# Patient Record
Sex: FEMALE | Race: ASIAN | NOT HISPANIC OR LATINO
[De-identification: names, ages, dates, MRNs, and addresses within clinical notes are randomized per-mention and may not be internally consistent; named-entity substitution may affect disease eponyms.]

---

## 2017-05-12 PROBLEM — Z00.00 ENCOUNTER FOR PREVENTIVE HEALTH EXAMINATION: Status: ACTIVE | Noted: 2017-05-12

## 2017-05-15 ENCOUNTER — APPOINTMENT (OUTPATIENT)
Dept: ANTEPARTUM | Facility: CLINIC | Age: 37
End: 2017-05-15

## 2017-07-11 ENCOUNTER — OUTPATIENT (OUTPATIENT)
Dept: OUTPATIENT SERVICES | Age: 37
LOS: 1 days | Discharge: HOME | End: 2017-07-11

## 2017-07-12 ENCOUNTER — APPOINTMENT (OUTPATIENT)
Dept: PEDIATRIC CARDIOLOGY | Facility: CLINIC | Age: 37
End: 2017-07-12

## 2017-09-19 ENCOUNTER — INPATIENT (INPATIENT)
Facility: HOSPITAL | Age: 37
LOS: 0 days | Discharge: HOME | End: 2017-09-20
Attending: OBSTETRICS & GYNECOLOGY | Admitting: OBSTETRICS & GYNECOLOGY

## 2017-09-19 VITALS — HEIGHT: 59 IN | WEIGHT: 149.91 LBS

## 2017-09-19 DIAGNOSIS — O26.899 OTHER SPECIFIED PREGNANCY RELATED CONDITIONS, UNSPECIFIED TRIMESTER: ICD-10-CM

## 2017-09-19 DIAGNOSIS — Z3A.00 WEEKS OF GESTATION OF PREGNANCY NOT SPECIFIED: ICD-10-CM

## 2017-09-19 LAB
APPEARANCE UR: CLEAR — SIGNIFICANT CHANGE UP
BACTERIA # UR AUTO: SIGNIFICANT CHANGE UP
BILIRUB UR-MCNC: NEGATIVE — SIGNIFICANT CHANGE UP
BLD GP AB SCN SERPL QL: NEGATIVE — SIGNIFICANT CHANGE UP
BLOOD UR QL VISUAL: NEGATIVE — SIGNIFICANT CHANGE UP
COLOR SPEC: SIGNIFICANT CHANGE UP
GLUCOSE UR-MCNC: NEGATIVE — SIGNIFICANT CHANGE UP
HCT VFR BLD CALC: 34.8 % — SIGNIFICANT CHANGE UP (ref 34.5–45)
HGB BLD-MCNC: 11.3 G/DL — LOW (ref 11.5–15.5)
KETONES UR-MCNC: SIGNIFICANT CHANGE UP
LEUKOCYTE ESTERASE UR-ACNC: NEGATIVE — SIGNIFICANT CHANGE UP
MCHC RBC-ENTMCNC: 27 PG — SIGNIFICANT CHANGE UP (ref 27–34)
MCHC RBC-ENTMCNC: 32.5 % — SIGNIFICANT CHANGE UP (ref 32–36)
MCV RBC AUTO: 83.3 FL — SIGNIFICANT CHANGE UP (ref 80–100)
MUCOUS THREADS # UR AUTO: SIGNIFICANT CHANGE UP
NITRITE UR-MCNC: NEGATIVE — SIGNIFICANT CHANGE UP
NRBC # FLD: 0 — SIGNIFICANT CHANGE UP
PH UR: 6 — SIGNIFICANT CHANGE UP (ref 4.6–8)
PLATELET # BLD AUTO: 237 K/UL — SIGNIFICANT CHANGE UP (ref 150–400)
PMV BLD: 10.9 FL — SIGNIFICANT CHANGE UP (ref 7–13)
PROT UR-MCNC: NEGATIVE — SIGNIFICANT CHANGE UP
RBC # BLD: 4.18 M/UL — SIGNIFICANT CHANGE UP (ref 3.8–5.2)
RBC # FLD: 16.3 % — HIGH (ref 10.3–14.5)
RBC CASTS # UR COMP ASSIST: SIGNIFICANT CHANGE UP (ref 0–?)
RH IG SCN BLD-IMP: POSITIVE — SIGNIFICANT CHANGE UP
SP GR SPEC: 1.01 — SIGNIFICANT CHANGE UP (ref 1–1.03)
SQUAMOUS # UR AUTO: SIGNIFICANT CHANGE UP
UROBILINOGEN FLD QL: NORMAL E.U. — SIGNIFICANT CHANGE UP (ref 0.1–0.2)
WBC # BLD: 9.8 K/UL — SIGNIFICANT CHANGE UP (ref 3.8–10.5)
WBC # FLD AUTO: 9.8 K/UL — SIGNIFICANT CHANGE UP (ref 3.8–10.5)
WBC UR QL: SIGNIFICANT CHANGE UP (ref 0–?)

## 2017-09-19 RX ORDER — SODIUM CHLORIDE 9 MG/ML
1000 INJECTION, SOLUTION INTRAVENOUS
Qty: 0 | Refills: 0 | Status: DISCONTINUED | OUTPATIENT
Start: 2017-09-19 | End: 2017-09-20

## 2017-09-19 RX ORDER — AMPICILLIN TRIHYDRATE 250 MG
2 CAPSULE ORAL ONCE
Qty: 0 | Refills: 0 | Status: COMPLETED | OUTPATIENT
Start: 2017-09-19 | End: 2017-09-19

## 2017-09-19 RX ORDER — SODIUM CHLORIDE 9 MG/ML
1000 INJECTION, SOLUTION INTRAVENOUS ONCE
Qty: 0 | Refills: 0 | Status: DISCONTINUED | OUTPATIENT
Start: 2017-09-19 | End: 2017-09-19

## 2017-09-19 RX ORDER — INFLUENZA VIRUS VACCINE 15; 15; 15; 15 UG/.5ML; UG/.5ML; UG/.5ML; UG/.5ML
0.5 SUSPENSION INTRAMUSCULAR ONCE
Qty: 0 | Refills: 0 | Status: DISCONTINUED | OUTPATIENT
Start: 2017-09-19 | End: 2017-09-20

## 2017-09-19 RX ORDER — OXYTOCIN 10 UNIT/ML
333.33 VIAL (ML) INJECTION
Qty: 20 | Refills: 0 | Status: DISCONTINUED | OUTPATIENT
Start: 2017-09-19 | End: 2017-09-19

## 2017-09-19 RX ORDER — AMPICILLIN TRIHYDRATE 250 MG
1 CAPSULE ORAL EVERY 4 HOURS
Qty: 0 | Refills: 0 | Status: DISCONTINUED | OUTPATIENT
Start: 2017-09-19 | End: 2017-09-20

## 2017-09-19 RX ORDER — MAGNESIUM SULFATE 500 MG/ML
2 VIAL (ML) INJECTION
Qty: 40 | Refills: 0 | Status: DISCONTINUED | OUTPATIENT
Start: 2017-09-19 | End: 2017-09-20

## 2017-09-19 RX ORDER — SODIUM CHLORIDE 9 MG/ML
1000 INJECTION, SOLUTION INTRAVENOUS
Qty: 0 | Refills: 0 | Status: DISCONTINUED | OUTPATIENT
Start: 2017-09-19 | End: 2017-09-19

## 2017-09-19 RX ORDER — MAGNESIUM SULFATE 500 MG/ML
4 VIAL (ML) INJECTION ONCE
Qty: 0 | Refills: 0 | Status: COMPLETED | OUTPATIENT
Start: 2017-09-19 | End: 2017-09-19

## 2017-09-19 RX ORDER — AMPICILLIN TRIHYDRATE 250 MG
CAPSULE ORAL
Qty: 0 | Refills: 0 | Status: DISCONTINUED | OUTPATIENT
Start: 2017-09-19 | End: 2017-09-20

## 2017-09-19 RX ADMIN — Medication 12 MILLIGRAM(S): at 13:12

## 2017-09-19 RX ADMIN — Medication 50 GM/HR: at 13:58

## 2017-09-19 RX ADMIN — Medication 216 GRAM(S): at 13:59

## 2017-09-19 RX ADMIN — Medication 300 GRAM(S): at 13:15

## 2017-09-19 RX ADMIN — Medication 50 GM/HR: at 23:01

## 2017-09-19 RX ADMIN — Medication 108 GRAM(S): at 22:00

## 2017-09-19 RX ADMIN — Medication 108 GRAM(S): at 18:17

## 2017-09-19 RX ADMIN — SODIUM CHLORIDE 50 MILLILITER(S): 9 INJECTION, SOLUTION INTRAVENOUS at 13:59

## 2017-09-20 ENCOUNTER — TRANSCRIPTION ENCOUNTER (OUTPATIENT)
Age: 37
End: 2017-09-20

## 2017-09-20 ENCOUNTER — APPOINTMENT (OUTPATIENT)
Dept: ANTEPARTUM | Facility: HOSPITAL | Age: 37
End: 2017-09-20
Payer: COMMERCIAL

## 2017-09-20 ENCOUNTER — ASOB RESULT (OUTPATIENT)
Age: 37
End: 2017-09-20

## 2017-09-20 LAB
SPECIMEN SOURCE: SIGNIFICANT CHANGE UP
T PALLIDUM AB TITR SER: NEGATIVE — SIGNIFICANT CHANGE UP

## 2017-09-20 PROCEDURE — 76805 OB US >/= 14 WKS SNGL FETUS: CPT | Mod: 26

## 2017-09-20 PROCEDURE — 76819 FETAL BIOPHYS PROFIL W/O NST: CPT | Mod: 26

## 2017-09-20 RX ORDER — DEXTROSE 50 % IN WATER 50 %
25 SYRINGE (ML) INTRAVENOUS ONCE
Qty: 0 | Refills: 0 | Status: DISCONTINUED | OUTPATIENT
Start: 2017-09-20 | End: 2017-09-20

## 2017-09-20 RX ORDER — INSULIN LISPRO 100/ML
10 VIAL (ML) SUBCUTANEOUS
Qty: 0 | Refills: 0 | Status: DISCONTINUED | OUTPATIENT
Start: 2017-09-20 | End: 2017-09-20

## 2017-09-20 RX ORDER — SODIUM CHLORIDE 9 MG/ML
1000 INJECTION, SOLUTION INTRAVENOUS
Qty: 0 | Refills: 0 | Status: DISCONTINUED | OUTPATIENT
Start: 2017-09-20 | End: 2017-09-20

## 2017-09-20 RX ORDER — MAGNESIUM SULFATE 500 MG/ML
2 VIAL (ML) INJECTION
Qty: 40 | Refills: 0 | Status: DISCONTINUED | OUTPATIENT
Start: 2017-09-20 | End: 2017-09-20

## 2017-09-20 RX ORDER — INSULIN DETEMIR 100/ML (3)
34 INSULIN PEN (ML) SUBCUTANEOUS
Qty: 0 | Refills: 0 | COMMUNITY

## 2017-09-20 RX ORDER — DEXTROSE 50 % IN WATER 50 %
1 SYRINGE (ML) INTRAVENOUS ONCE
Qty: 0 | Refills: 0 | Status: DISCONTINUED | OUTPATIENT
Start: 2017-09-20 | End: 2017-09-20

## 2017-09-20 RX ORDER — DEXTROSE 50 % IN WATER 50 %
12.5 SYRINGE (ML) INTRAVENOUS ONCE
Qty: 0 | Refills: 0 | Status: DISCONTINUED | OUTPATIENT
Start: 2017-09-20 | End: 2017-09-20

## 2017-09-20 RX ORDER — INSULIN GLARGINE 100 [IU]/ML
34 INJECTION, SOLUTION SUBCUTANEOUS AT BEDTIME
Qty: 0 | Refills: 0 | Status: DISCONTINUED | OUTPATIENT
Start: 2017-09-20 | End: 2017-09-20

## 2017-09-20 RX ORDER — INSULIN LISPRO 100/ML
0 VIAL (ML) SUBCUTANEOUS
Qty: 0 | Refills: 0 | COMMUNITY

## 2017-09-20 RX ORDER — GLUCAGON INJECTION, SOLUTION 0.5 MG/.1ML
1 INJECTION, SOLUTION SUBCUTANEOUS ONCE
Qty: 0 | Refills: 0 | Status: DISCONTINUED | OUTPATIENT
Start: 2017-09-20 | End: 2017-09-20

## 2017-09-20 RX ADMIN — Medication 12 MILLIGRAM(S): at 14:04

## 2017-09-20 RX ADMIN — Medication 1 TABLET(S): at 15:40

## 2017-09-20 RX ADMIN — Medication 50 GM/HR: at 08:46

## 2017-09-20 RX ADMIN — SODIUM CHLORIDE 75 MILLILITER(S): 9 INJECTION, SOLUTION INTRAVENOUS at 02:04

## 2017-09-20 RX ADMIN — SODIUM CHLORIDE 75 MILLILITER(S): 9 INJECTION, SOLUTION INTRAVENOUS at 00:18

## 2017-09-20 RX ADMIN — Medication 108 GRAM(S): at 10:50

## 2017-09-20 RX ADMIN — Medication 108 GRAM(S): at 02:04

## 2017-09-20 RX ADMIN — Medication 50 GM/HR: at 07:09

## 2017-09-20 RX ADMIN — Medication 10 UNIT(S): at 12:22

## 2017-09-20 RX ADMIN — Medication 108 GRAM(S): at 06:02

## 2017-09-20 RX ADMIN — SODIUM CHLORIDE 75 MILLILITER(S): 9 INJECTION, SOLUTION INTRAVENOUS at 06:02

## 2017-09-20 NOTE — DISCHARGE NOTE ANTEPARTUM - HOSPITAL COURSE
Patient admitted at 32w1d with contractions. Patient received betamethasone for fetal lung maturity, magnesium sulfate for tocolysis and antibiotics for GBS prophylaxis with good effect. On HD#2 patient no longer feeling contractions. Patient d/c HD#2 with close interval follow up. PTL precautions given. Patient admitted at 32w1d with contractions and CL 0.6cm. Patient received betamethasone for fetal lung maturity, magnesium sulfate for tocolysis and antibiotics for GBS prophylaxis with good effect. On HD#2 patient no longer feeling contractions. Patient d/c HD#2 with close interval follow up. PTL precautions given.

## 2017-09-20 NOTE — DISCHARGE NOTE ANTEPARTUM - CARE PLAN
Principal Discharge DX:	 contractions  Goal:	Home  Instructions for follow-up, activity and diet:	Normal diet and activity as tolerated

## 2017-09-20 NOTE — DISCHARGE NOTE ANTEPARTUM - CARE PROVIDER_API CALL
Callie Monahan), Obstetrics and Gynecology  [address]  [address]  [place] [address]  Phone: [phone]  Fax: [phone]

## 2017-09-20 NOTE — DISCHARGE NOTE ANTEPARTUM - PATIENT PORTAL LINK FT
“You can access the Follow[employer] Patient Portal, offered by [facility], by registering with the following website: [email or web address]”

## 2017-09-20 NOTE — DISCHARGE NOTE ANTEPARTUM - ADDITIONAL INSTRUCTIONS
Please follow up with Dr. Monahan as scheduled  Return to L&D with contractions, leakage of fluid, vaginal bleeding and/or decreased fetal movement

## 2017-09-21 LAB
BACTERIA UR CULT: SIGNIFICANT CHANGE UP
SPECIMEN SOURCE: SIGNIFICANT CHANGE UP

## 2017-09-22 LAB — GP B STREP GENITAL QL CULT: SIGNIFICANT CHANGE UP

## 2017-11-03 ENCOUNTER — TRANSCRIPTION ENCOUNTER (OUTPATIENT)
Age: 37
End: 2017-11-03

## 2017-11-03 ENCOUNTER — OUTPATIENT (OUTPATIENT)
Dept: OUTPATIENT SERVICES | Facility: HOSPITAL | Age: 37
LOS: 1 days | End: 2017-11-03

## 2017-11-03 ENCOUNTER — EMERGENCY (EMERGENCY)
Facility: HOSPITAL | Age: 37
LOS: 1 days | Discharge: STILL ADMITTED OR ADMINISTRATIVE | End: 2017-11-03
Admitting: EMERGENCY MEDICINE

## 2017-11-03 VITALS
RESPIRATION RATE: 18 BRPM | OXYGEN SATURATION: 100 % | DIASTOLIC BLOOD PRESSURE: 87 MMHG | SYSTOLIC BLOOD PRESSURE: 144 MMHG | HEART RATE: 79 BPM

## 2017-11-03 DIAGNOSIS — O34.219 MATERNAL CARE FOR UNSPECIFIED TYPE SCAR FROM PREVIOUS CESAREAN DELIVERY: ICD-10-CM

## 2017-11-03 LAB
APPEARANCE UR: CLEAR — SIGNIFICANT CHANGE UP
BACTERIA # UR AUTO: SIGNIFICANT CHANGE UP
BILIRUB UR-MCNC: NEGATIVE — SIGNIFICANT CHANGE UP
BLD GP AB SCN SERPL QL: NEGATIVE — SIGNIFICANT CHANGE UP
BLOOD UR QL VISUAL: NEGATIVE — SIGNIFICANT CHANGE UP
BUN SERPL-MCNC: 6 MG/DL — LOW (ref 7–23)
CALCIUM SERPL-MCNC: 9 MG/DL — SIGNIFICANT CHANGE UP (ref 8.4–10.5)
CHLORIDE SERPL-SCNC: 100 MMOL/L — SIGNIFICANT CHANGE UP (ref 98–107)
CO2 SERPL-SCNC: 21 MMOL/L — LOW (ref 22–31)
COLOR SPEC: SIGNIFICANT CHANGE UP
CREAT SERPL-MCNC: 0.66 MG/DL — SIGNIFICANT CHANGE UP (ref 0.5–1.3)
GLUCOSE SERPL-MCNC: 74 MG/DL — SIGNIFICANT CHANGE UP (ref 70–99)
GLUCOSE UR-MCNC: NEGATIVE — SIGNIFICANT CHANGE UP
HCT VFR BLD CALC: 37.2 % — SIGNIFICANT CHANGE UP (ref 34.5–45)
HGB BLD-MCNC: 12.3 G/DL — SIGNIFICANT CHANGE UP (ref 11.5–15.5)
KETONES UR-MCNC: NEGATIVE — SIGNIFICANT CHANGE UP
LEUKOCYTE ESTERASE UR-ACNC: HIGH
MCHC RBC-ENTMCNC: 27.5 PG — SIGNIFICANT CHANGE UP (ref 27–34)
MCHC RBC-ENTMCNC: 33.1 % — SIGNIFICANT CHANGE UP (ref 32–36)
MCV RBC AUTO: 83.2 FL — SIGNIFICANT CHANGE UP (ref 80–100)
NITRITE UR-MCNC: NEGATIVE — SIGNIFICANT CHANGE UP
NON-SQ EPI CELLS # UR AUTO: <1 — SIGNIFICANT CHANGE UP
NRBC # FLD: 0 — SIGNIFICANT CHANGE UP
PH UR: 6.5 — SIGNIFICANT CHANGE UP (ref 4.6–8)
PLATELET # BLD AUTO: 231 K/UL — SIGNIFICANT CHANGE UP (ref 150–400)
PMV BLD: 11.6 FL — SIGNIFICANT CHANGE UP (ref 7–13)
POTASSIUM SERPL-MCNC: 3.7 MMOL/L — SIGNIFICANT CHANGE UP (ref 3.5–5.3)
POTASSIUM SERPL-SCNC: 3.7 MMOL/L — SIGNIFICANT CHANGE UP (ref 3.5–5.3)
PROT UR-MCNC: NEGATIVE — SIGNIFICANT CHANGE UP
RBC # BLD: 4.47 M/UL — SIGNIFICANT CHANGE UP (ref 3.8–5.2)
RBC # FLD: 16.5 % — HIGH (ref 10.3–14.5)
RBC CASTS # UR COMP ASSIST: SIGNIFICANT CHANGE UP (ref 0–?)
RH IG SCN BLD-IMP: POSITIVE — SIGNIFICANT CHANGE UP
SODIUM SERPL-SCNC: 138 MMOL/L — SIGNIFICANT CHANGE UP (ref 135–145)
SP GR SPEC: 1.01 — SIGNIFICANT CHANGE UP (ref 1–1.03)
SQUAMOUS # UR AUTO: SIGNIFICANT CHANGE UP
UROBILINOGEN FLD QL: NORMAL E.U. — SIGNIFICANT CHANGE UP (ref 0.1–0.2)
WBC # BLD: 9.43 K/UL — SIGNIFICANT CHANGE UP (ref 3.8–10.5)
WBC # FLD AUTO: 9.43 K/UL — SIGNIFICANT CHANGE UP (ref 3.8–10.5)
WBC UR QL: SIGNIFICANT CHANGE UP (ref 0–?)

## 2017-11-03 NOTE — ED ADULT TRIAGE NOTE - CHIEF COMPLAINT QUOTE
pt presents 35 weeks pregnant c/o active contractions 1 minute apart, began at 9pm. ems states this is pt's 4th pregnancy. PMHX: gestational dm, miscarriage x 1. pt direct to L&D

## 2017-11-04 ENCOUNTER — TRANSCRIPTION ENCOUNTER (OUTPATIENT)
Age: 37
End: 2017-11-04

## 2017-11-04 ENCOUNTER — INPATIENT (INPATIENT)
Facility: HOSPITAL | Age: 37
LOS: 2 days | Discharge: HOME | End: 2017-11-07
Attending: OBSTETRICS & GYNECOLOGY | Admitting: OBSTETRICS & GYNECOLOGY

## 2017-11-04 VITALS
DIASTOLIC BLOOD PRESSURE: 41 MMHG | OXYGEN SATURATION: 97 % | TEMPERATURE: 98 F | HEART RATE: 86 BPM | RESPIRATION RATE: 16 BRPM | SYSTOLIC BLOOD PRESSURE: 96 MMHG

## 2017-11-04 DIAGNOSIS — Z3A.00 WEEKS OF GESTATION OF PREGNANCY NOT SPECIFIED: ICD-10-CM

## 2017-11-04 DIAGNOSIS — O26.899 OTHER SPECIFIED PREGNANCY RELATED CONDITIONS, UNSPECIFIED TRIMESTER: ICD-10-CM

## 2017-11-04 LAB
BASOPHILS # BLD AUTO: 0.04 K/UL — SIGNIFICANT CHANGE UP (ref 0–0.2)
BASOPHILS NFR BLD AUTO: 0.4 % — SIGNIFICANT CHANGE UP (ref 0–2)
BLD GP AB SCN SERPL QL: NEGATIVE — SIGNIFICANT CHANGE UP
EOSINOPHIL # BLD AUTO: 0.14 K/UL — SIGNIFICANT CHANGE UP (ref 0–0.5)
EOSINOPHIL NFR BLD AUTO: 1.3 % — SIGNIFICANT CHANGE UP (ref 0–6)
GLUCOSE BLDC GLUCOMTR-MCNC: 101 MG/DL — HIGH (ref 70–99)
GLUCOSE BLDC GLUCOMTR-MCNC: 103 MG/DL — HIGH (ref 70–99)
GLUCOSE BLDC GLUCOMTR-MCNC: 131 MG/DL — HIGH (ref 70–99)
GLUCOSE BLDC GLUCOMTR-MCNC: 75 MG/DL — SIGNIFICANT CHANGE UP (ref 70–99)
GLUCOSE BLDC GLUCOMTR-MCNC: 80 MG/DL — SIGNIFICANT CHANGE UP (ref 70–99)
HCT VFR BLD CALC: 29.1 % — LOW (ref 34.5–45)
HCT VFR BLD CALC: 39.4 % — SIGNIFICANT CHANGE UP (ref 34.5–45)
HGB BLD-MCNC: 12.5 G/DL — SIGNIFICANT CHANGE UP (ref 11.5–15.5)
HGB BLD-MCNC: 9.6 G/DL — LOW (ref 11.5–15.5)
IMM GRANULOCYTES # BLD AUTO: 0.06 # — SIGNIFICANT CHANGE UP
IMM GRANULOCYTES NFR BLD AUTO: 0.5 % — SIGNIFICANT CHANGE UP (ref 0–1.5)
LYMPHOCYTES # BLD AUTO: 2.49 K/UL — SIGNIFICANT CHANGE UP (ref 1–3.3)
LYMPHOCYTES # BLD AUTO: 22.5 % — SIGNIFICANT CHANGE UP (ref 13–44)
MCHC RBC-ENTMCNC: 26.7 PG — LOW (ref 27–34)
MCHC RBC-ENTMCNC: 27.3 PG — SIGNIFICANT CHANGE UP (ref 27–34)
MCHC RBC-ENTMCNC: 31.7 % — LOW (ref 32–36)
MCHC RBC-ENTMCNC: 33 % — SIGNIFICANT CHANGE UP (ref 32–36)
MCV RBC AUTO: 82.7 FL — SIGNIFICANT CHANGE UP (ref 80–100)
MCV RBC AUTO: 84 FL — SIGNIFICANT CHANGE UP (ref 80–100)
MONOCYTES # BLD AUTO: 0.96 K/UL — HIGH (ref 0–0.9)
MONOCYTES NFR BLD AUTO: 8.7 % — SIGNIFICANT CHANGE UP (ref 2–14)
NEUTROPHILS # BLD AUTO: 7.36 K/UL — SIGNIFICANT CHANGE UP (ref 1.8–7.4)
NEUTROPHILS NFR BLD AUTO: 66.6 % — SIGNIFICANT CHANGE UP (ref 43–77)
NRBC # FLD: 0 — SIGNIFICANT CHANGE UP
NRBC # FLD: 0 — SIGNIFICANT CHANGE UP
PLATELET # BLD AUTO: 218 K/UL — SIGNIFICANT CHANGE UP (ref 150–400)
PLATELET # BLD AUTO: 253 K/UL — SIGNIFICANT CHANGE UP (ref 150–400)
PMV BLD: 11 FL — SIGNIFICANT CHANGE UP (ref 7–13)
PMV BLD: 11.1 FL — SIGNIFICANT CHANGE UP (ref 7–13)
RBC # BLD: 3.52 M/UL — LOW (ref 3.8–5.2)
RBC # BLD: 4.69 M/UL — SIGNIFICANT CHANGE UP (ref 3.8–5.2)
RBC # FLD: 16.4 % — HIGH (ref 10.3–14.5)
RBC # FLD: 16.6 % — HIGH (ref 10.3–14.5)
RH IG SCN BLD-IMP: POSITIVE — SIGNIFICANT CHANGE UP
T PALLIDUM AB TITR SER: NEGATIVE — SIGNIFICANT CHANGE UP
WBC # BLD: 10.56 K/UL — HIGH (ref 3.8–10.5)
WBC # BLD: 11.05 K/UL — HIGH (ref 3.8–10.5)
WBC # FLD AUTO: 10.56 K/UL — HIGH (ref 3.8–10.5)
WBC # FLD AUTO: 11.05 K/UL — HIGH (ref 3.8–10.5)

## 2017-11-04 RX ORDER — SIMETHICONE 80 MG/1
80 TABLET, CHEWABLE ORAL EVERY 4 HOURS
Qty: 0 | Refills: 0 | Status: DISCONTINUED | OUTPATIENT
Start: 2017-11-04 | End: 2017-11-07

## 2017-11-04 RX ORDER — LANOLIN
1 OINTMENT (GRAM) TOPICAL
Qty: 0 | Refills: 0 | Status: DISCONTINUED | OUTPATIENT
Start: 2017-11-04 | End: 2017-11-07

## 2017-11-04 RX ORDER — DOCUSATE SODIUM 100 MG
100 CAPSULE ORAL
Qty: 0 | Refills: 0 | Status: DISCONTINUED | OUTPATIENT
Start: 2017-11-04 | End: 2017-11-06

## 2017-11-04 RX ORDER — OXYCODONE HYDROCHLORIDE 5 MG/1
5 TABLET ORAL
Qty: 0 | Refills: 0 | Status: COMPLETED | OUTPATIENT
Start: 2017-11-04 | End: 2017-11-11

## 2017-11-04 RX ORDER — FAMOTIDINE 10 MG/ML
20 INJECTION INTRAVENOUS ONCE
Qty: 0 | Refills: 0 | Status: DISCONTINUED | OUTPATIENT
Start: 2017-11-04 | End: 2017-11-04

## 2017-11-04 RX ORDER — ONDANSETRON 8 MG/1
4 TABLET, FILM COATED ORAL EVERY 6 HOURS
Qty: 0 | Refills: 0 | Status: DISCONTINUED | OUTPATIENT
Start: 2017-11-04 | End: 2017-11-06

## 2017-11-04 RX ORDER — KETOROLAC TROMETHAMINE 30 MG/ML
30 SYRINGE (ML) INJECTION EVERY 6 HOURS
Qty: 0 | Refills: 0 | Status: DISCONTINUED | OUTPATIENT
Start: 2017-11-04 | End: 2017-11-05

## 2017-11-04 RX ORDER — SODIUM CHLORIDE 9 MG/ML
1000 INJECTION, SOLUTION INTRAVENOUS
Qty: 0 | Refills: 0 | Status: DISCONTINUED | OUTPATIENT
Start: 2017-11-04 | End: 2017-11-06

## 2017-11-04 RX ORDER — SODIUM CHLORIDE 9 MG/ML
1000 INJECTION, SOLUTION INTRAVENOUS
Qty: 0 | Refills: 0 | Status: DISCONTINUED | OUTPATIENT
Start: 2017-11-04 | End: 2017-11-04

## 2017-11-04 RX ORDER — GLYCERIN ADULT
1 SUPPOSITORY, RECTAL RECTAL AT BEDTIME
Qty: 0 | Refills: 0 | Status: DISCONTINUED | OUTPATIENT
Start: 2017-11-04 | End: 2017-11-07

## 2017-11-04 RX ORDER — OXYCODONE HYDROCHLORIDE 5 MG/1
5 TABLET ORAL
Qty: 0 | Refills: 0 | Status: DISCONTINUED | OUTPATIENT
Start: 2017-11-04 | End: 2017-11-07

## 2017-11-04 RX ORDER — ACETAMINOPHEN 500 MG
975 TABLET ORAL EVERY 6 HOURS
Qty: 0 | Refills: 0 | Status: DISCONTINUED | OUTPATIENT
Start: 2017-11-04 | End: 2017-11-07

## 2017-11-04 RX ORDER — METOCLOPRAMIDE HCL 10 MG
10 TABLET ORAL ONCE
Qty: 0 | Refills: 0 | Status: DISCONTINUED | OUTPATIENT
Start: 2017-11-04 | End: 2017-11-04

## 2017-11-04 RX ORDER — TETANUS TOXOID, REDUCED DIPHTHERIA TOXOID AND ACELLULAR PERTUSSIS VACCINE, ADSORBED 5; 2.5; 8; 8; 2.5 [IU]/.5ML; [IU]/.5ML; UG/.5ML; UG/.5ML; UG/.5ML
0.5 SUSPENSION INTRAMUSCULAR ONCE
Qty: 0 | Refills: 0 | Status: COMPLETED | OUTPATIENT
Start: 2017-11-04

## 2017-11-04 RX ORDER — OXYTOCIN 10 UNIT/ML
333.33 VIAL (ML) INJECTION
Qty: 20 | Refills: 0 | Status: DISCONTINUED | OUTPATIENT
Start: 2017-11-04 | End: 2017-11-06

## 2017-11-04 RX ORDER — OXYCODONE HYDROCHLORIDE 5 MG/1
10 TABLET ORAL
Qty: 0 | Refills: 0 | Status: DISCONTINUED | OUTPATIENT
Start: 2017-11-04 | End: 2017-11-06

## 2017-11-04 RX ORDER — HEPARIN SODIUM 5000 [USP'U]/ML
5000 INJECTION INTRAVENOUS; SUBCUTANEOUS EVERY 12 HOURS
Qty: 0 | Refills: 0 | Status: DISCONTINUED | OUTPATIENT
Start: 2017-11-04 | End: 2017-11-07

## 2017-11-04 RX ORDER — OXYTOCIN 10 UNIT/ML
41.67 VIAL (ML) INJECTION
Qty: 20 | Refills: 0 | Status: DISCONTINUED | OUTPATIENT
Start: 2017-11-04 | End: 2017-11-06

## 2017-11-04 RX ORDER — INFLUENZA VIRUS VACCINE 15; 15; 15; 15 UG/.5ML; UG/.5ML; UG/.5ML; UG/.5ML
0.5 SUSPENSION INTRAMUSCULAR ONCE
Qty: 0 | Refills: 0 | Status: COMPLETED | OUTPATIENT
Start: 2017-11-04 | End: 2017-11-05

## 2017-11-04 RX ORDER — OXYCODONE HYDROCHLORIDE 5 MG/1
5 TABLET ORAL EVERY 4 HOURS
Qty: 0 | Refills: 0 | Status: DISCONTINUED | OUTPATIENT
Start: 2017-11-04 | End: 2017-11-07

## 2017-11-04 RX ORDER — FERROUS SULFATE 325(65) MG
325 TABLET ORAL DAILY
Qty: 0 | Refills: 0 | Status: DISCONTINUED | OUTPATIENT
Start: 2017-11-04 | End: 2017-11-06

## 2017-11-04 RX ORDER — DIPHENHYDRAMINE HCL 50 MG
25 CAPSULE ORAL EVERY 6 HOURS
Qty: 0 | Refills: 0 | Status: DISCONTINUED | OUTPATIENT
Start: 2017-11-04 | End: 2017-11-07

## 2017-11-04 RX ORDER — IBUPROFEN 200 MG
600 TABLET ORAL EVERY 6 HOURS
Qty: 0 | Refills: 0 | Status: COMPLETED | OUTPATIENT
Start: 2017-11-04 | End: 2018-10-03

## 2017-11-04 RX ORDER — SODIUM CHLORIDE 9 MG/ML
1000 INJECTION, SOLUTION INTRAVENOUS ONCE
Qty: 0 | Refills: 0 | Status: COMPLETED | OUTPATIENT
Start: 2017-11-04 | End: 2017-11-04

## 2017-11-04 RX ORDER — HYDROMORPHONE HYDROCHLORIDE 2 MG/ML
1 INJECTION INTRAMUSCULAR; INTRAVENOUS; SUBCUTANEOUS
Qty: 0 | Refills: 0 | Status: DISCONTINUED | OUTPATIENT
Start: 2017-11-04 | End: 2017-11-06

## 2017-11-04 RX ORDER — OXYCODONE HYDROCHLORIDE 5 MG/1
5 TABLET ORAL
Qty: 0 | Refills: 0 | Status: DISCONTINUED | OUTPATIENT
Start: 2017-11-04 | End: 2017-11-06

## 2017-11-04 RX ORDER — SODIUM CHLORIDE 9 MG/ML
1000 INJECTION, SOLUTION INTRAVENOUS ONCE
Qty: 0 | Refills: 0 | Status: DISCONTINUED | OUTPATIENT
Start: 2017-11-04 | End: 2017-11-04

## 2017-11-04 RX ORDER — CITRIC ACID/SODIUM CITRATE 300-500 MG
30 SOLUTION, ORAL ORAL ONCE
Qty: 0 | Refills: 0 | Status: DISCONTINUED | OUTPATIENT
Start: 2017-11-04 | End: 2017-11-04

## 2017-11-04 RX ADMIN — SODIUM CHLORIDE 2000 MILLILITER(S): 9 INJECTION, SOLUTION INTRAVENOUS at 00:40

## 2017-11-04 RX ADMIN — OXYCODONE HYDROCHLORIDE 5 MILLIGRAM(S): 5 TABLET ORAL at 23:45

## 2017-11-04 RX ADMIN — OXYCODONE HYDROCHLORIDE 10 MILLIGRAM(S): 5 TABLET ORAL at 11:22

## 2017-11-04 RX ADMIN — Medication 975 MILLIGRAM(S): at 18:52

## 2017-11-04 RX ADMIN — OXYCODONE HYDROCHLORIDE 10 MILLIGRAM(S): 5 TABLET ORAL at 10:23

## 2017-11-04 RX ADMIN — Medication 975 MILLIGRAM(S): at 13:09

## 2017-11-04 RX ADMIN — Medication 30 MILLIGRAM(S): at 17:55

## 2017-11-04 RX ADMIN — Medication 30 MILLILITER(S): at 01:00

## 2017-11-04 RX ADMIN — OXYCODONE HYDROCHLORIDE 10 MILLIGRAM(S): 5 TABLET ORAL at 09:17

## 2017-11-04 RX ADMIN — Medication 30 MILLIGRAM(S): at 18:52

## 2017-11-04 RX ADMIN — SODIUM CHLORIDE 1000 MILLILITER(S): 9 INJECTION, SOLUTION INTRAVENOUS at 02:30

## 2017-11-04 RX ADMIN — Medication 10 MILLIGRAM(S): at 01:00

## 2017-11-04 RX ADMIN — OXYCODONE HYDROCHLORIDE 10 MILLIGRAM(S): 5 TABLET ORAL at 06:45

## 2017-11-04 RX ADMIN — Medication 1 TABLET(S): at 12:19

## 2017-11-04 RX ADMIN — Medication 125 MILLIUNIT(S)/MIN: at 03:03

## 2017-11-04 RX ADMIN — OXYCODONE HYDROCHLORIDE 5 MILLIGRAM(S): 5 TABLET ORAL at 23:15

## 2017-11-04 RX ADMIN — HEPARIN SODIUM 5000 UNIT(S): 5000 INJECTION INTRAVENOUS; SUBCUTANEOUS at 23:16

## 2017-11-04 RX ADMIN — Medication 975 MILLIGRAM(S): at 17:55

## 2017-11-04 RX ADMIN — Medication 30 MILLIGRAM(S): at 13:09

## 2017-11-04 RX ADMIN — Medication 30 MILLIGRAM(S): at 12:19

## 2017-11-04 RX ADMIN — Medication 325 MILLIGRAM(S): at 12:18

## 2017-11-04 RX ADMIN — HEPARIN SODIUM 5000 UNIT(S): 5000 INJECTION INTRAVENOUS; SUBCUTANEOUS at 09:16

## 2017-11-04 RX ADMIN — Medication 975 MILLIGRAM(S): at 12:18

## 2017-11-04 RX ADMIN — FAMOTIDINE 20 MILLIGRAM(S): 10 INJECTION INTRAVENOUS at 01:00

## 2017-11-04 NOTE — DISCHARGE NOTE OB - PATIENT PORTAL LINK FT
“You can access the Follow[employer] Patient Portal, offered by [facility], by registering with the following website: [email or web address]”

## 2017-11-04 NOTE — DISCHARGE NOTE OB - MEDICATION SUMMARY - MEDICATIONS TO TAKE
I will START or STAY ON the medications listed below when I get home from the hospital:  None I will START or STAY ON the medications listed below when I get home from the hospital:    ibuprofen 600 mg oral tablet  -- 1 tab(s) by mouth every 6 hours, As Needed  -- Indication: For  delivery delivered    acetaminophen 325 mg oral tablet  -- 3 tab(s) by mouth every 6 hours, As Needed  -- Indication: For  delivery delivered    Prenatal Multivitamins with Folic Acid 1 mg oral tablet  -- 1 tab(s) by mouth once a day  -- Indication: For  delivery delivered

## 2017-11-04 NOTE — DISCHARGE NOTE OB - CARE PLAN
Principal Discharge DX:	 delivery delivered  Goal:	return to normal ADLs  Instructions for follow-up, activity and diet:	2 weeks pp; vaginal rest, regular diet Principal Discharge DX:	 delivery delivered  Goal:	return to normal ADLs  Instructions for follow-up, activity and diet:	2 weeks pp visit; vaginal rest, regular diet

## 2017-11-04 NOTE — DISCHARGE NOTE OB - PLAN OF CARE
return to normal ADLs 2 weeks pp; vaginal rest, regular diet 2 weeks pp visit; vaginal rest, regular diet

## 2017-11-04 NOTE — DISCHARGE NOTE OB - CARE PROVIDER_API CALL
Callie Monahan), Obstetrics and Gynecology  [address]  [address]  [place] [address]  Phone: [phone]  Fax: [phone]

## 2017-11-05 LAB
GLUCOSE BLDC GLUCOMTR-MCNC: 134 MG/DL — HIGH (ref 70–99)
GLUCOSE BLDC GLUCOMTR-MCNC: 75 MG/DL — SIGNIFICANT CHANGE UP (ref 70–99)
GLUCOSE BLDC GLUCOMTR-MCNC: 93 MG/DL — SIGNIFICANT CHANGE UP (ref 70–99)
GLUCOSE BLDC GLUCOMTR-MCNC: 94 MG/DL — SIGNIFICANT CHANGE UP (ref 70–99)

## 2017-11-05 RX ORDER — IBUPROFEN 200 MG
600 TABLET ORAL EVERY 6 HOURS
Qty: 0 | Refills: 0 | Status: DISCONTINUED | OUTPATIENT
Start: 2017-11-05 | End: 2017-11-07

## 2017-11-05 RX ADMIN — OXYCODONE HYDROCHLORIDE 5 MILLIGRAM(S): 5 TABLET ORAL at 14:00

## 2017-11-05 RX ADMIN — Medication 975 MILLIGRAM(S): at 02:46

## 2017-11-05 RX ADMIN — HEPARIN SODIUM 5000 UNIT(S): 5000 INJECTION INTRAVENOUS; SUBCUTANEOUS at 10:38

## 2017-11-05 RX ADMIN — OXYCODONE HYDROCHLORIDE 5 MILLIGRAM(S): 5 TABLET ORAL at 13:18

## 2017-11-05 RX ADMIN — Medication 975 MILLIGRAM(S): at 18:22

## 2017-11-05 RX ADMIN — Medication 600 MILLIGRAM(S): at 13:14

## 2017-11-05 RX ADMIN — INFLUENZA VIRUS VACCINE 0.5 MILLILITER(S): 15; 15; 15; 15 SUSPENSION INTRAMUSCULAR at 10:43

## 2017-11-05 RX ADMIN — Medication 975 MILLIGRAM(S): at 18:53

## 2017-11-05 RX ADMIN — OXYCODONE HYDROCHLORIDE 5 MILLIGRAM(S): 5 TABLET ORAL at 18:52

## 2017-11-05 RX ADMIN — Medication 325 MILLIGRAM(S): at 13:13

## 2017-11-05 RX ADMIN — Medication 600 MILLIGRAM(S): at 14:00

## 2017-11-05 RX ADMIN — Medication 975 MILLIGRAM(S): at 08:09

## 2017-11-05 RX ADMIN — HEPARIN SODIUM 5000 UNIT(S): 5000 INJECTION INTRAVENOUS; SUBCUTANEOUS at 22:48

## 2017-11-05 RX ADMIN — Medication 975 MILLIGRAM(S): at 02:16

## 2017-11-05 RX ADMIN — OXYCODONE HYDROCHLORIDE 5 MILLIGRAM(S): 5 TABLET ORAL at 18:24

## 2017-11-05 RX ADMIN — Medication 975 MILLIGRAM(S): at 09:00

## 2017-11-05 RX ADMIN — Medication 1 TABLET(S): at 13:15

## 2017-11-05 NOTE — PROGRESS NOTE ADULT - SUBJECTIVE AND OBJECTIVE BOX
Postop Day  __1_ s/p   C- Section    THERAPY:    [ x ] Spinal morphine ___0.2_ mg  [  ] Epidural morphine ___ mg  [  ] IV PCA Hydromophone 1 mg/ml    OBJECTIVE:    Sedation Score:	  [ x ] Alert	    [  ] Drowsy        [  ] Arousable	[  ] Asleep	[  ] Unresponsive    Side Effects:	  [x  ] None	     [  ] Nausea        [  ] Pruritus        [  ] Weaknes   [  ] Numbness   [  ] Other:        ASSESSMENT/ PLAN  [  ] Continue   [ x ] Discpntinue   [ x ]Documentation and Verification of current medications     Comments:

## 2017-11-05 NOTE — PROGRESS NOTE ADULT - SUBJECTIVE AND OBJECTIVE BOX
She is a  37y woman who is now post-operative day1:     Subjective:  The patient feels well. no cp/sob  She is tolerating regular diet.  She denies nausea and vomiting.  She reports normal postpartum bleeding.    Objective:  Vital Signs Last 24 Hrs  T(C): 37.3 (2017 06:43), Max: 37.3 (2017 00:23)  T(F): 99.1 (2017 06:43), Max: 99.1 (2017 00:23)  HR: 84 (2017 06:43) (66 - 88)  BP: 104/53 (2017 06:43) (98/46 - 115/61)  RR: 18 (2017 06:43) (16 - 18)  SpO2: 95% (2017 06:43) (95% - 98%)    Constitutional: NAD  Abdomen: Soft, nontender, no distension, firm uterine fundus  Incision: Clean, dry, and intact  Pelvic: Normal lochia noted  Ext: No calf tenderness bilaterally    LABS:                        9.6    10.56 )-----------( 218      ( 2017 20:46 )             29.1                         12.5   11.05 )-----------( 253      ( 2017 00:40 )             39.4                         12.3   9.43  )-----------( 231      ( 2017 11:00 )             37.2         Allergies    No Known Allergies    Intolerances      MEDICATIONS  (STANDING):  acetaminophen   Tablet. 975 milliGRAM(s) Oral every 6 hours  diphtheria/tetanus/pertussis (acellular) Vaccine (ADAcel) 0.5 milliLiter(s) IntraMuscular once  ferrous    sulfate 325 milliGRAM(s) Oral daily  heparin  Injectable 5000 Unit(s) SubCutaneous every 12 hours  ibuprofen  Tablet 600 milliGRAM(s) Oral every 6 hours  influenza   Vaccine 0.5 milliLiter(s) IntraMuscular once  ketorolac   Injectable 30 milliGRAM(s) IV Push every 6 hours  lactated ringers. 1000 milliLiter(s) (125 mL/Hr) IV Continuous <Continuous>  oxyCODONE    IR 5 milliGRAM(s) Oral every 3 hours  oxytocin Infusion 41.667 milliUNIT(s)/Min (125 mL/Hr) IV Continuous <Continuous>  oxytocin Infusion 333.333 milliUNIT(s)/Min (1000 mL/Hr) IV Continuous <Continuous>  oxytocin Infusion 41.667 milliUNIT(s)/Min (125 mL/Hr) IV Continuous <Continuous>  prenatal multivitamin 1 Tablet(s) Oral daily    Assessment and Plan  Pt stable POD #1 s/p  section  -continues postoperative and postpartum care  -encourage ambulation

## 2017-11-05 NOTE — PROGRESS NOTE ADULT - SUBJECTIVE AND OBJECTIVE BOX
Postpartum Note,  Section   37y year old woman post-operative day 1 from uncomplicated repeat LTCS.  No acute events overnight.  Patient has no complaints and pain is well-controlled.  Patient is tolerating regular diet, passing flatus, ambulating, has a ovalle catheter in place.  Postpartum bleeding is well controlled.    Physical exam:    Vital Signs Last 24 Hrs  T(C): 37.3 (2017 06:43), Max: 37.3 (2017 00:23)  T(F): 99.1 (2017 06:43), Max: 99.1 (2017 00:23)  HR: 84 (:43) (69 - 88)  BP: 104/53 (2017 06:43) (98/46 - 115/61)  BP(mean): --  RR: 18 (2017 06:43) (16 - 18)  SpO2: 95% (2017 06:43) (95% - 96%)     @ 08:01  -   @ 07:00  --------------------------------------------------------  IN: 0 mL / OUT: 3100 mL / NET: -3100 mL        Gen: NAD  Abdomen: Soft, nontender, no distension , firm uterine fundus at umbilicus.  Incision: Clean, dry, and intact with steri strips  Pelvic: Normal lochia noted  Ext: No calf tenderness    LABS:                        9.6    10.56 )-----------( 218      ( 2017 20:46 )             29.1                         12.5   11.05 )-----------( 253      ( 2017 00:40 )             39.4                         12.3   9.43  )-----------( 231      ( 2017 11:00 )             37.2       17 @ 11:00      138  |  100  |  6<L>  ----------------------------<  74  3.7   |  21<L>  |  0.66        Ca    9.0      2017 11:00

## 2017-11-06 LAB
GLUCOSE BLDC GLUCOMTR-MCNC: 127 MG/DL — HIGH (ref 70–99)
GLUCOSE BLDC GLUCOMTR-MCNC: 146 MG/DL — HIGH (ref 70–99)
GLUCOSE BLDC GLUCOMTR-MCNC: 76 MG/DL — SIGNIFICANT CHANGE UP (ref 70–99)
GLUCOSE BLDC GLUCOMTR-MCNC: 98 MG/DL — SIGNIFICANT CHANGE UP (ref 70–99)

## 2017-11-06 RX ORDER — FERROUS SULFATE 325(65) MG
325 TABLET ORAL
Qty: 0 | Refills: 0 | Status: DISCONTINUED | OUTPATIENT
Start: 2017-11-06 | End: 2017-11-07

## 2017-11-06 RX ORDER — TETANUS TOXOID, REDUCED DIPHTHERIA TOXOID AND ACELLULAR PERTUSSIS VACCINE, ADSORBED 5; 2.5; 8; 8; 2.5 [IU]/.5ML; [IU]/.5ML; UG/.5ML; UG/.5ML; UG/.5ML
0.5 SUSPENSION INTRAMUSCULAR ONCE
Qty: 0 | Refills: 0 | Status: COMPLETED | OUTPATIENT
Start: 2017-11-06 | End: 2017-11-06

## 2017-11-06 RX ORDER — ASCORBIC ACID 60 MG
500 TABLET,CHEWABLE ORAL DAILY
Qty: 0 | Refills: 0 | Status: DISCONTINUED | OUTPATIENT
Start: 2017-11-06 | End: 2017-11-07

## 2017-11-06 RX ORDER — DOCUSATE SODIUM 100 MG
100 CAPSULE ORAL
Qty: 0 | Refills: 0 | Status: DISCONTINUED | OUTPATIENT
Start: 2017-11-06 | End: 2017-11-07

## 2017-11-06 RX ADMIN — OXYCODONE HYDROCHLORIDE 5 MILLIGRAM(S): 5 TABLET ORAL at 00:23

## 2017-11-06 RX ADMIN — Medication 975 MILLIGRAM(S): at 16:49

## 2017-11-06 RX ADMIN — Medication 1 TABLET(S): at 11:29

## 2017-11-06 RX ADMIN — Medication 325 MILLIGRAM(S): at 09:42

## 2017-11-06 RX ADMIN — Medication 500 MILLIGRAM(S): at 11:28

## 2017-11-06 RX ADMIN — Medication 975 MILLIGRAM(S): at 00:23

## 2017-11-06 RX ADMIN — OXYCODONE HYDROCHLORIDE 5 MILLIGRAM(S): 5 TABLET ORAL at 16:51

## 2017-11-06 RX ADMIN — OXYCODONE HYDROCHLORIDE 5 MILLIGRAM(S): 5 TABLET ORAL at 01:00

## 2017-11-06 RX ADMIN — OXYCODONE HYDROCHLORIDE 5 MILLIGRAM(S): 5 TABLET ORAL at 22:12

## 2017-11-06 RX ADMIN — HEPARIN SODIUM 5000 UNIT(S): 5000 INJECTION INTRAVENOUS; SUBCUTANEOUS at 09:41

## 2017-11-06 RX ADMIN — OXYCODONE HYDROCHLORIDE 5 MILLIGRAM(S): 5 TABLET ORAL at 16:56

## 2017-11-06 RX ADMIN — TETANUS TOXOID, REDUCED DIPHTHERIA TOXOID AND ACELLULAR PERTUSSIS VACCINE, ADSORBED 0.5 MILLILITER(S): 5; 2.5; 8; 8; 2.5 SUSPENSION INTRAMUSCULAR at 11:26

## 2017-11-06 RX ADMIN — Medication 600 MILLIGRAM(S): at 22:10

## 2017-11-06 RX ADMIN — Medication 975 MILLIGRAM(S): at 22:40

## 2017-11-06 RX ADMIN — Medication 975 MILLIGRAM(S): at 16:56

## 2017-11-06 RX ADMIN — Medication 975 MILLIGRAM(S): at 11:27

## 2017-11-06 RX ADMIN — OXYCODONE HYDROCHLORIDE 5 MILLIGRAM(S): 5 TABLET ORAL at 11:27

## 2017-11-06 RX ADMIN — OXYCODONE HYDROCHLORIDE 5 MILLIGRAM(S): 5 TABLET ORAL at 05:45

## 2017-11-06 RX ADMIN — Medication 600 MILLIGRAM(S): at 22:40

## 2017-11-06 RX ADMIN — HEPARIN SODIUM 5000 UNIT(S): 5000 INJECTION INTRAVENOUS; SUBCUTANEOUS at 22:03

## 2017-11-06 RX ADMIN — OXYCODONE HYDROCHLORIDE 5 MILLIGRAM(S): 5 TABLET ORAL at 06:33

## 2017-11-06 RX ADMIN — Medication 975 MILLIGRAM(S): at 01:00

## 2017-11-06 RX ADMIN — Medication 100 MILLIGRAM(S): at 05:45

## 2017-11-06 RX ADMIN — Medication 325 MILLIGRAM(S): at 16:55

## 2017-11-06 RX ADMIN — Medication 975 MILLIGRAM(S): at 22:10

## 2017-11-06 RX ADMIN — Medication 975 MILLIGRAM(S): at 06:33

## 2017-11-06 RX ADMIN — OXYCODONE HYDROCHLORIDE 5 MILLIGRAM(S): 5 TABLET ORAL at 22:40

## 2017-11-06 RX ADMIN — Medication 100 MILLIGRAM(S): at 16:56

## 2017-11-06 RX ADMIN — Medication 975 MILLIGRAM(S): at 05:45

## 2017-11-06 NOTE — PROGRESS NOTE ADULT - SUBJECTIVE AND OBJECTIVE BOX
OB Progress Note: rTLCS, POD#2    38 yo  POD#2 s/p rLTCS. Pain is well controlled. She is tolerating a regular diet and passing flatus. She is voiding spontaneously, and ambulating without difficulty. Denies CP/SOB. Denies lightheadedness/dizziness. Denies N/V.    O:  Vitals:  Vital Signs Last 24 Hrs  T(C): 36.6 (2017 05:26), Max: 37 (2017 15:00)  T(F): 97.9 (2017 05:26), Max: 98.6 (2017 15:00)  HR: 67 (2017 05:26) (67 - 79)  BP: 115/65 (2017 05:26) (100/60 - 115/65)  BP(mean): --  RR: 16 (2017 05:26) (16 - 18)  SpO2: 100% (2017 05:26) (97% - 100%)    MEDICATIONS  (STANDING):  acetaminophen   Tablet. 975 milliGRAM(s) Oral every 6 hours  ascorbic acid 500 milliGRAM(s) Oral daily  diphtheria/tetanus/pertussis (acellular) Vaccine (ADAcel) 0.5 milliLiter(s) IntraMuscular once  docusate sodium 100 milliGRAM(s) Oral two times a day  ferrous    sulfate 325 milliGRAM(s) Oral three times a day with meals  heparin  Injectable 5000 Unit(s) SubCutaneous every 12 hours  ibuprofen  Tablet 600 milliGRAM(s) Oral every 6 hours  oxyCODONE    IR 5 milliGRAM(s) Oral every 3 hours  prenatal multivitamin 1 Tablet(s) Oral daily      MEDICATIONS  (PRN):  diphenhydrAMINE   Capsule 25 milliGRAM(s) Oral every 6 hours PRN Itching  glycerin Suppository - Adult 1 Suppository(s) Rectal at bedtime PRN Constipation  lanolin Ointment 1 Application(s) Topical every 3 hours PRN Sore Nipples  oxyCODONE    IR 5 milliGRAM(s) Oral every 4 hours PRN Severe Pain (7 - 10)  simethicone 80 milliGRAM(s) Chew every 4 hours PRN Gas      Labs:  Blood type: O Positive  Rubella IgG: RPR: Negative                          9.6<L>   10.56<H> >-----------< 218    (  @ 20:46 )             29.1<L>                        12.5   11.05<H> >-----------< 253    (  @ 00:40 )             39.4                        12.3   9.43 >-----------< 231    (  @ 11:00 )             37.2    - @ 11:00      138  |  100  |  6<L>  ----------------------------<  74  3.7   |  21<L>  |  0.66        Ca    9.0      2017 11:00            PE:  General: NAD  Abdomen: Soft, appropriately tender, incision c/d/i.  Extremities: No erythema, no pitting edema          Susan Lewis, PGY-1 OB Progress Note: rTLCS, POD#2    38 yo  POD#2 s/p rLTCS. Pain is well controlled. She is tolerating a regular diet and passing flatus. She is voiding spontaneously, and ambulating without difficulty. Denies CP/SOB. Denies lightheadedness/dizziness. Denies N/V.    O:  Vitals:  Vital Signs Last 24 Hrs  T(C): 36.6 (2017 05:26), Max: 37 (2017 15:00)  T(F): 97.9 (2017 05:26), Max: 98.6 (2017 15:00)  HR: 67 (2017 05:26) (67 - 79)  BP: 115/65 (2017 05:26) (100/60 - 115/65)  BP(mean): --  RR: 16 (2017 05:26) (16 - 18)  SpO2: 100% (2017 05:26) (97% - 100%)    MEDICATIONS  (STANDING):  acetaminophen   Tablet. 975 milliGRAM(s) Oral every 6 hours  ascorbic acid 500 milliGRAM(s) Oral daily  diphtheria/tetanus/pertussis (acellular) Vaccine (ADAcel) 0.5 milliLiter(s) IntraMuscular once  docusate sodium 100 milliGRAM(s) Oral two times a day  ferrous    sulfate 325 milliGRAM(s) Oral three times a day with meals  heparin  Injectable 5000 Unit(s) SubCutaneous every 12 hours  ibuprofen  Tablet 600 milliGRAM(s) Oral every 6 hours  oxyCODONE    IR 5 milliGRAM(s) Oral every 3 hours  prenatal multivitamin 1 Tablet(s) Oral daily      MEDICATIONS  (PRN):  diphenhydrAMINE   Capsule 25 milliGRAM(s) Oral every 6 hours PRN Itching  glycerin Suppository - Adult 1 Suppository(s) Rectal at bedtime PRN Constipation  lanolin Ointment 1 Application(s) Topical every 3 hours PRN Sore Nipples  oxyCODONE    IR 5 milliGRAM(s) Oral every 4 hours PRN Severe Pain (7 - 10)  simethicone 80 milliGRAM(s) Chew every 4 hours PRN Gas      Labs:  Blood type: O Positive  Rubella IgG: RPR: Negative                          9.6<L>   10.56<H> >-----------< 218    (  @ 20:46 )             29.1<L>                        12.5   11.05<H> >-----------< 253    (  @ 00:40 )             39.4                        12.3   9.43 >-----------< 231    (  @ 11:00 )             37.2    - @ 11:00      138  |  100  |  6<L>  ----------------------------<  74  3.7   |  21<L>  |  0.66        Ca    9.0      2017 11:00            PE:  General: NAD  Abdomen: Soft, appropriately tender, incision c/d/i. staples in place  Extremities: No erythema, no pitting edema          Susan Lewis, PGY-1

## 2017-11-06 NOTE — PROGRESS NOTE ADULT - SUBJECTIVE AND OBJECTIVE BOX
Postpartum Note,  Section  She is a  37y woman who is now post-operative day: 2    Subjective:  The patient feels well.  She is ambulating.   She is tolerating regular diet.  She denies nausea and vomiting.  She is voiding.  Her pain is controlled.  She reports normal postpartum bleeding.  She is breastfeeding.  She reports flatus.    Physical exam:    Vital Signs Last 24 Hrs  T(C): 36.6 (2017 05:26), Max: 37 (2017 15:00)  T(F): 97.9 (2017 05:26), Max: 98.6 (2017 15:00)  HR: 67 (2017 05:26) (67 - 79)  BP: 115/65 (2017 05:26) (100/60 - 115/65)  BP(mean): --  RR: 16 (2017 05:26) (16 - 18)  SpO2: 100% (2017 05:26) (97% - 100%)    Gen: NAD  Abdomen: Soft, nontender, no distension , firm uterine fundus at umbilicus.  Incision: Clean, dry, and intact   Ext: No calf tenderness    LABS:                        9.6    10.56 )-----------( 218      ( 2017 20:46 )             29.1       Rubella status:     Allergies    No Known Allergies    Intolerances      MEDICATIONS  (STANDING):  acetaminophen   Tablet. 975 milliGRAM(s) Oral every 6 hours  ascorbic acid 500 milliGRAM(s) Oral daily  diphtheria/tetanus/pertussis (acellular) Vaccine (ADAcel) 0.5 milliLiter(s) IntraMuscular once  docusate sodium 100 milliGRAM(s) Oral two times a day  ferrous    sulfate 325 milliGRAM(s) Oral three times a day with meals  heparin  Injectable 5000 Unit(s) SubCutaneous every 12 hours  ibuprofen  Tablet 600 milliGRAM(s) Oral every 6 hours  oxyCODONE    IR 5 milliGRAM(s) Oral every 3 hours  prenatal multivitamin 1 Tablet(s) Oral daily    MEDICATIONS  (PRN):  diphenhydrAMINE   Capsule 25 milliGRAM(s) Oral every 6 hours PRN Itching  glycerin Suppository - Adult 1 Suppository(s) Rectal at bedtime PRN Constipation  lanolin Ointment 1 Application(s) Topical every 3 hours PRN Sore Nipples  oxyCODONE    IR 5 milliGRAM(s) Oral every 4 hours PRN Severe Pain (7 - 10)  simethicone 80 milliGRAM(s) Chew every 4 hours PRN Gas        Assessment and Plan  POD # 2 s/p  section  Doing well.  Encourage ambulation.  continue POC  Discharge home

## 2017-11-07 VITALS
DIASTOLIC BLOOD PRESSURE: 62 MMHG | HEART RATE: 71 BPM | OXYGEN SATURATION: 96 % | SYSTOLIC BLOOD PRESSURE: 104 MMHG | RESPIRATION RATE: 17 BRPM | TEMPERATURE: 98 F

## 2017-11-07 LAB — GLUCOSE BLDC GLUCOMTR-MCNC: 75 MG/DL — SIGNIFICANT CHANGE UP (ref 70–99)

## 2017-11-07 RX ORDER — IBUPROFEN 200 MG
1 TABLET ORAL
Qty: 0 | Refills: 0 | COMMUNITY
Start: 2017-11-07

## 2017-11-07 RX ORDER — ACETAMINOPHEN 500 MG
3 TABLET ORAL
Qty: 0 | Refills: 0 | COMMUNITY
Start: 2017-11-07

## 2017-11-07 RX ADMIN — OXYCODONE HYDROCHLORIDE 5 MILLIGRAM(S): 5 TABLET ORAL at 13:10

## 2017-11-07 RX ADMIN — Medication 325 MILLIGRAM(S): at 12:29

## 2017-11-07 RX ADMIN — Medication 1 TABLET(S): at 12:29

## 2017-11-07 RX ADMIN — Medication 500 MILLIGRAM(S): at 12:29

## 2017-11-07 RX ADMIN — Medication 975 MILLIGRAM(S): at 13:10

## 2017-11-07 RX ADMIN — Medication 100 MILLIGRAM(S): at 06:02

## 2017-11-07 RX ADMIN — Medication 975 MILLIGRAM(S): at 12:29

## 2017-11-07 RX ADMIN — OXYCODONE HYDROCHLORIDE 5 MILLIGRAM(S): 5 TABLET ORAL at 06:36

## 2017-11-07 RX ADMIN — Medication 325 MILLIGRAM(S): at 09:08

## 2017-11-07 RX ADMIN — Medication 975 MILLIGRAM(S): at 06:06

## 2017-11-07 RX ADMIN — Medication 975 MILLIGRAM(S): at 06:36

## 2017-11-07 RX ADMIN — OXYCODONE HYDROCHLORIDE 5 MILLIGRAM(S): 5 TABLET ORAL at 06:06

## 2017-11-07 RX ADMIN — OXYCODONE HYDROCHLORIDE 5 MILLIGRAM(S): 5 TABLET ORAL at 12:28

## 2017-11-07 NOTE — PROGRESS NOTE ADULT - SUBJECTIVE AND OBJECTIVE BOX
OB Postpartum Note:   Delivery, POD#3    S: 38yo POD#3 s/p rLTCS. The patient feels well.  Pain is well controlled. She is tolerating a regular diet and passing flatus. She is voiding spontaneously, and ambulating without difficulty. Denies CP/SOB. Denies lightheadedness/dizziness. Denies N/V.    O:  Vitals:  Vital Signs Last 24 Hrs  T(C): 36.6 (2017 06:06), Max: 36.9 (2017 22:08)  T(F): 97.8 (2017 06:06), Max: 98.4 (2017 22:08)  HR: 71 (2017 06:06) (71 - 88)  BP: 104/62 (2017 06:06) (104/62 - 126/60)  BP(mean): --  RR: 17 (2017 06:06) (17 - 18)  SpO2: 96% (2017 06:06) (96% - 99%)    MEDICATIONS  (STANDING):  acetaminophen   Tablet. 975 milliGRAM(s) Oral every 6 hours  ascorbic acid 500 milliGRAM(s) Oral daily  docusate sodium 100 milliGRAM(s) Oral two times a day  ferrous    sulfate 325 milliGRAM(s) Oral three times a day with meals  heparin  Injectable 5000 Unit(s) SubCutaneous every 12 hours  ibuprofen  Tablet 600 milliGRAM(s) Oral every 6 hours  oxyCODONE    IR 5 milliGRAM(s) Oral every 3 hours  prenatal multivitamin 1 Tablet(s) Oral daily    MEDICATIONS  (PRN):  diphenhydrAMINE   Capsule 25 milliGRAM(s) Oral every 6 hours PRN Itching  glycerin Suppository - Adult 1 Suppository(s) Rectal at bedtime PRN Constipation  lanolin Ointment 1 Application(s) Topical every 3 hours PRN Sore Nipples  oxyCODONE    IR 5 milliGRAM(s) Oral every 4 hours PRN Severe Pain (7 - 10)  simethicone 80 milliGRAM(s) Chew every 4 hours PRN Gas      LABS:  Blood type: O Positive  Rubella IgG: RPR: Negative                          9.6<L>   10.56<H> >-----------< 218    (  @ 20:46 )             29.1<L>                  Physical exam:  Gen: NAD  Abdomen: Soft, nontender, no distension , firm uterine fundus at umbilicus.  Incision: Clean, dry, and intact   Pelvic: Normal lochia noted  Ext: No calf tenderness        Susan Lewis PGY-1

## 2019-02-24 ENCOUNTER — EMERGENCY (EMERGENCY)
Facility: HOSPITAL | Age: 39
LOS: 1 days | Discharge: HOME | End: 2019-02-24
Attending: EMERGENCY MEDICINE | Admitting: EMERGENCY MEDICINE
Payer: COMMERCIAL

## 2019-02-24 VITALS
TEMPERATURE: 98 F | RESPIRATION RATE: 16 BRPM | HEART RATE: 86 BPM | OXYGEN SATURATION: 100 % | SYSTOLIC BLOOD PRESSURE: 114 MMHG | DIASTOLIC BLOOD PRESSURE: 60 MMHG

## 2019-02-24 DIAGNOSIS — Z3A.00 WEEKS OF GESTATION OF PREGNANCY NOT SPECIFIED: ICD-10-CM

## 2019-02-24 DIAGNOSIS — N93.9 ABNORMAL UTERINE AND VAGINAL BLEEDING, UNSPECIFIED: ICD-10-CM

## 2019-02-24 LAB
ALBUMIN SERPL ELPH-MCNC: 4.3 G/DL — SIGNIFICANT CHANGE UP (ref 3.3–5)
ALP SERPL-CCNC: 64 U/L — SIGNIFICANT CHANGE UP (ref 40–120)
ALT FLD-CCNC: 10 U/L — SIGNIFICANT CHANGE UP (ref 4–33)
ANION GAP SERPL CALC-SCNC: 13 MMO/L — SIGNIFICANT CHANGE UP (ref 7–14)
ANISOCYTOSIS BLD QL: SIGNIFICANT CHANGE UP
APTT BLD: 25.5 SEC — LOW (ref 27.5–36.3)
AST SERPL-CCNC: 31 U/L — SIGNIFICANT CHANGE UP (ref 4–32)
BASOPHILS # BLD AUTO: 0.02 K/UL — SIGNIFICANT CHANGE UP (ref 0–0.2)
BASOPHILS NFR BLD AUTO: 0.4 % — SIGNIFICANT CHANGE UP (ref 0–2)
BASOPHILS NFR SPEC: 0 % — SIGNIFICANT CHANGE UP (ref 0–2)
BILIRUB SERPL-MCNC: 0.2 MG/DL — SIGNIFICANT CHANGE UP (ref 0.2–1.2)
BLASTS # FLD: 0 % — SIGNIFICANT CHANGE UP (ref 0–0)
BLD GP AB SCN SERPL QL: NEGATIVE — SIGNIFICANT CHANGE UP
BUN SERPL-MCNC: 9 MG/DL — SIGNIFICANT CHANGE UP (ref 7–23)
CALCIUM SERPL-MCNC: 9 MG/DL — SIGNIFICANT CHANGE UP (ref 8.4–10.5)
CHLORIDE SERPL-SCNC: 102 MMOL/L — SIGNIFICANT CHANGE UP (ref 98–107)
CO2 SERPL-SCNC: 22 MMOL/L — SIGNIFICANT CHANGE UP (ref 22–31)
CREAT SERPL-MCNC: 0.71 MG/DL — SIGNIFICANT CHANGE UP (ref 0.5–1.3)
EOSINOPHIL # BLD AUTO: 0.12 K/UL — SIGNIFICANT CHANGE UP (ref 0–0.5)
EOSINOPHIL NFR BLD AUTO: 2.4 % — SIGNIFICANT CHANGE UP (ref 0–6)
EOSINOPHIL NFR FLD: 3.6 % — SIGNIFICANT CHANGE UP (ref 0–6)
FLU A RESULT: DETECTED — HIGH
FLU A RESULT: DETECTED — HIGH
FLUAV AG NPH QL: DETECTED — HIGH
FLUBV AG NPH QL: NOT DETECTED — SIGNIFICANT CHANGE UP
GIANT PLATELETS BLD QL SMEAR: PRESENT — SIGNIFICANT CHANGE UP
GLUCOSE SERPL-MCNC: 95 MG/DL — SIGNIFICANT CHANGE UP (ref 70–99)
HCT VFR BLD CALC: 20.9 % — CRITICAL LOW (ref 34.5–45)
HGB BLD-MCNC: 5.3 G/DL — CRITICAL LOW (ref 11.5–15.5)
HYPOCHROMIA BLD QL: SIGNIFICANT CHANGE UP
IMM GRANULOCYTES NFR BLD AUTO: 0 % — SIGNIFICANT CHANGE UP (ref 0–1.5)
INR BLD: 0.96 — SIGNIFICANT CHANGE UP (ref 0.88–1.17)
LYMPHOCYTES # BLD AUTO: 2.13 K/UL — SIGNIFICANT CHANGE UP (ref 1–3.3)
LYMPHOCYTES # BLD AUTO: 42.9 % — SIGNIFICANT CHANGE UP (ref 13–44)
LYMPHOCYTES NFR SPEC AUTO: 36.4 % — SIGNIFICANT CHANGE UP (ref 13–44)
MCHC RBC-ENTMCNC: 14.8 PG — LOW (ref 27–34)
MCHC RBC-ENTMCNC: 25.4 % — LOW (ref 32–36)
MCV RBC AUTO: 58.5 FL — LOW (ref 80–100)
METAMYELOCYTES # FLD: 0 % — SIGNIFICANT CHANGE UP (ref 0–1)
MICROCYTES BLD QL: SIGNIFICANT CHANGE UP
MONOCYTES # BLD AUTO: 0.56 K/UL — SIGNIFICANT CHANGE UP (ref 0–0.9)
MONOCYTES NFR BLD AUTO: 11.3 % — SIGNIFICANT CHANGE UP (ref 2–14)
MONOCYTES NFR BLD: 5.5 % — SIGNIFICANT CHANGE UP (ref 2–9)
MYELOCYTES NFR BLD: 0 % — SIGNIFICANT CHANGE UP (ref 0–0)
NEUTROPHIL AB SER-ACNC: 53.6 % — SIGNIFICANT CHANGE UP (ref 43–77)
NEUTROPHILS # BLD AUTO: 2.13 K/UL — SIGNIFICANT CHANGE UP (ref 1.8–7.4)
NEUTROPHILS NFR BLD AUTO: 43 % — SIGNIFICANT CHANGE UP (ref 43–77)
NEUTS BAND # BLD: 0 % — SIGNIFICANT CHANGE UP (ref 0–6)
NRBC # BLD: 1 /100WBC — SIGNIFICANT CHANGE UP
NRBC # FLD: 0.02 K/UL — LOW (ref 25–125)
NT-PROBNP SERPL-SCNC: 79.94 PG/ML — SIGNIFICANT CHANGE UP
OTHER - HEMATOLOGY %: 0 — SIGNIFICANT CHANGE UP
OVALOCYTES BLD QL SMEAR: SIGNIFICANT CHANGE UP
PLATELET # BLD AUTO: 525 K/UL — HIGH (ref 150–400)
PLATELET COUNT - ESTIMATE: SIGNIFICANT CHANGE UP
PMV BLD: 10.3 FL — SIGNIFICANT CHANGE UP (ref 7–13)
POIKILOCYTOSIS BLD QL AUTO: SIGNIFICANT CHANGE UP
POLYCHROMASIA BLD QL SMEAR: SLIGHT — SIGNIFICANT CHANGE UP
POTASSIUM SERPL-MCNC: 4.3 MMOL/L — SIGNIFICANT CHANGE UP (ref 3.5–5.3)
POTASSIUM SERPL-SCNC: 4.3 MMOL/L — SIGNIFICANT CHANGE UP (ref 3.5–5.3)
PROMYELOCYTES # FLD: 0 % — SIGNIFICANT CHANGE UP (ref 0–0)
PROT SERPL-MCNC: 8.1 G/DL — SIGNIFICANT CHANGE UP (ref 6–8.3)
PROTHROM AB SERPL-ACNC: 10.9 SEC — SIGNIFICANT CHANGE UP (ref 9.8–13.1)
RBC # BLD: 3.57 M/UL — LOW (ref 3.8–5.2)
RBC # FLD: 18.7 % — HIGH (ref 10.3–14.5)
RH IG SCN BLD-IMP: POSITIVE — SIGNIFICANT CHANGE UP
RSV RESULT: SIGNIFICANT CHANGE UP
RSV RNA RESP QL NAA+PROBE: SIGNIFICANT CHANGE UP
SODIUM SERPL-SCNC: 137 MMOL/L — SIGNIFICANT CHANGE UP (ref 135–145)
STOMATOCYTES BLD QL SMEAR: SLIGHT — SIGNIFICANT CHANGE UP
TROPONIN T, HIGH SENSITIVITY: < 6 NG/L — SIGNIFICANT CHANGE UP (ref ?–14)
VARIANT LYMPHS # BLD: 0.9 % — SIGNIFICANT CHANGE UP
WBC # BLD: 4.96 K/UL — SIGNIFICANT CHANGE UP (ref 3.8–10.5)
WBC # FLD AUTO: 4.96 K/UL — SIGNIFICANT CHANGE UP (ref 3.8–10.5)

## 2019-02-24 PROCEDURE — 76830 TRANSVAGINAL US NON-OB: CPT | Mod: 26

## 2019-02-24 PROCEDURE — 93010 ELECTROCARDIOGRAM REPORT: CPT | Mod: 59

## 2019-02-24 PROCEDURE — 93308 TTE F-UP OR LMTD: CPT | Mod: 26

## 2019-02-24 PROCEDURE — 71046 X-RAY EXAM CHEST 2 VIEWS: CPT | Mod: 26

## 2019-02-24 PROCEDURE — 99220: CPT | Mod: 25

## 2019-02-24 RX ORDER — ACETAMINOPHEN 500 MG
650 TABLET ORAL ONCE
Qty: 0 | Refills: 0 | Status: COMPLETED | OUTPATIENT
Start: 2019-02-24 | End: 2019-02-24

## 2019-02-24 RX ADMIN — Medication 650 MILLIGRAM(S): at 18:17

## 2019-02-24 RX ADMIN — Medication 75 MILLIGRAM(S): at 23:29

## 2019-02-24 NOTE — ED PROVIDER NOTE - PHYSICAL EXAMINATION
GEN - NAD; well appearing; A+O x3   HEAD - NC/AT     EYES - EOMI, no conjunctival pallor, no scleral icterus  ENT -   mucous membranes  moist , no discharge      NECK - Neck supple  PULM - CTA b/l,  symmetric breath sounds  COR -  RRR, S1 S2, no murmurs, equal DP pulses  ABD - , ND, NT, soft, no guarding, no rebound, no masses    BACK - no CVA tenderness, nontender spine     EXTREMS - no edema or calf tenderness, no deformity, warm and well perfused    SKIN - no rash or bruising      NEUROLOGIC - alert, sensation nl, motor 5/5 RUE/LUE/RLE/LLE

## 2019-02-24 NOTE — ED CDU PROVIDER INITIAL DAY NOTE - ATTENDING CONTRIBUTION TO CARE
Dr. Arellano: I performed a face to face bedside interview with patient regarding history of present illness, review of symptoms and past medical history. I completed an independent physical exam.  I have discussed patient's plan of care with PA.   I agree with note as stated above, having amended the EMR as needed to reflect my findings.   This includes HISTORY OF PRESENT ILLNESS, HIV, PAST MEDICAL/SURGICAL/FAMILY/SOCIAL HISTORY, ALLERGIES AND HOME MEDICATIONS, REVIEW OF SYSTEMS, PHYSICAL EXAM, and any PROGRESS NOTES during the time I functioned as the attending physician for this patient.    38F p/w ONEILL, dizziness, heavy vaginal bleeding x2 weeks. also with cough and subj fever x 2 days. kids at home with flu.  CTA b/l, afebrile. found to have Hg 5.3 and also flu positive.  given tamiflu, GYN c/s pending. Pt placed in CDU for transfusion, observation and repeat blood work.

## 2019-02-24 NOTE — ED ADULT NURSE NOTE - OBJECTIVE STATEMENT
pt here c/o sob and flu like symptoms/ iv and labs sent off/ denies cp/ pt extremely pale / meds given and fluids hung as ordered

## 2019-02-24 NOTE — ED PROVIDER NOTE - PROGRESS NOTE DETAILS
POCT echo with normal contractility. No right heart strain or pericardial effusion. Hg 5.3, pt reports that has been having heavy vaginal bleeding for last 2 weeks, using 12 large pads per day. Denies hx of fibroids. Has not seen her GYN for this yet. On pelvic exam has dark blood in vaginal vault and small clot, no active bleeding. Will transfuse, GYN called and will evaluate patient, will get pelvic US as well.    Also flu positive, given symptoms started x 2d, will give Tamiflu.

## 2019-02-24 NOTE — CONSULT NOTE ADULT - PROBLEM SELECTOR RECOMMENDATION 9
- recommend aygestin 5mg qday x 7 days.   - will follow CBC trend  - pad counts  - tylenol PRN for pain control  - recommend outpatient f/u in 1 week    d/w Dr. Carlin Monaco pgy2

## 2019-02-24 NOTE — ED PROVIDER NOTE - OBJECTIVE STATEMENT
38F with no sig pmh here with 2 months of ONEILL worsening over last 2 days. P reports subj fever, lightheadedness and fatigue. also with nonproductive cough x 1 week. Pt is schedule to see a cardiologist in next 1 week. Denies cp, calf pain, hx of PE/DVT, recent surgeries or travel or OCP use. 3 kids at home positive for flu.

## 2019-02-24 NOTE — ED ADULT TRIAGE NOTE - CHIEF COMPLAINT QUOTE
pt comes to ED for dizziness SOB and cough x 2 days. pt is sating 100% on RA pt VSS face mask applied in tirage ekg to be obtained

## 2019-02-24 NOTE — ED CDU PROVIDER INITIAL DAY NOTE - MEDICAL DECISION MAKING DETAILS
pt with lightheadedness, cough, quiroz, heavy vaginal bleeding; anemic hg 5.3 and flu +; started on familu and IVH and being transfused 2UPRBC in CDU, GYN consulted and requests aygestin 5mg QD x 7 days. Pt otherwise stable. Will continue to monitor and reassess

## 2019-02-24 NOTE — ED ADULT TRIAGE NOTE - ACCOMPANIED BY
Addendum  created 10/06/17 [id number] by Rad Issa, DO    Order sets accessed      
Immediate family member

## 2019-02-24 NOTE — ED PROVIDER NOTE - CARE PLAN
Principal Discharge DX:	Anemia, unspecified type  Secondary Diagnosis:	Vaginal bleeding  Secondary Diagnosis:	Influenza

## 2019-02-24 NOTE — CONSULT NOTE ADULT - SUBJECTIVE AND OBJECTIVE BOX
R2 Gyn Consult Note    38y G_P_ presents with       OB/GYN HISTORY:   [place]:  Parity:  Term:  :  MAB/TAB/Ectopic:  Living:    Last Menstrual Period    Name of GYN Physician:  Date of Last Pap:  History of Abnormal Pap:  Date of Last Mammogram:  Date of Last Colonoscopy:  Current OCP use:     PAST MEDICAL & SURGICAL HISTORY:  Gestational diabetes mellitus (GDM), antepartum, gestational diabetes method of control unspecified  No significant past surgical history      REVIEW OF SYSTEMS  General:	  ENMT:	  Respiratory and Thorax:  Cardiovascular:	  Gastrointestinal:	  Genitourinary:	    MEDICATIONS  (STANDING):    MEDICATIONS  (PRN):      Allergies  No Known Allergies    SOCIAL HISTORY:    FAMILY HISTORY:  No pertinent family history in first degree relatives      Vital Signs Last 24 Hrs  T(C): 36.7 (2019 15:13), Max: 36.7 (2019 15:13)  T(F): 98 (2019 15:13), Max: 98 (2019 15:13)  HR: 84 (2019 18:31) (84 - 86)  BP: 122/68 (2019 18:31) (114/60 - 122/68)  BP(mean): --  RR: 18 (2019 18:31) (16 - 18)  SpO2: 100% (2019 18:31) (100% - 100%)    PHYSICAL EXAM:  Constitutional: alert and oriented x 3  Respiratory: clear  Cardiovascular: regular rate and rhythm  Gastrointestinal: soft, non tender, + bowel sounds. No hepatosplenomegaly, no palpable masses  Genitourinary: NEFG  Cervix: closed/ long, no CMT  Uterus: normal size, non tender  Adnexa: non tender, no palpable masses        LABS:                        5.3    4.96  )-----------( 525      ( 2019 18:41 )             20.9     02-24    137  |  102  |  9   ----------------------------<  95  4.3   |  22  |  0.71    Ca    9.0      2019 18:41    TPro  8.1  /  Alb  4.3  /  TBili  0.2  /  DBili  x   /  AST  31  /  ALT  10  /  AlkPhos  64      PT/INR - ( 2019 19:35 )   PT: 10.9 SEC;   INR: 0.96          PTT - ( 2019 19:35 )  PTT:25.5 SEC      RADIOLOGY & ADDITIONAL STUDIES: R2 Gyn Consult Note    38y  female presented to the ED for worsening dyspnea for 2 days. Gyn consult for vaginal bleeding x 3weeks. She states that her past two menstrual cycles, she has experienced prolonged heavy menstrual bleeding. Her current menstrual cycle began last week of /first week of Feb. She states that she changes her pad every 1-2 hours because they are either saturated or have a blood clot on them. She reports dizziness, fatigue and weakness. Denies fevers, chills, sweats, CP, palpitations, abdominal pain, dysuria, and changes in BM. She reports increased urinary frequency which she contributes to increased water intake.     She states that her dyspnea has been occurring for 2 months. She was evaluated by her PCP one week ago where she was told that she has a new heart murmur. Patient is to follow up with cardiology and pulmonary for further evaluation. She states that she experiences dyspnea with exertion and orthopnea. She reports cough with white sputum production for the past 4 days, which she states worsens her vaginal bleeding.     OB/GYN HISTORY:   [place]: 4  Parity: 3   Term: 3  - pLTCS for arrest of descent, pregnancy complicated by gDM 7#9oz. 2011 - rLTCS, placenta previa 6#5oz. 2017 - rLTCS, pregnancy complicated by gDM and anemia 8#  : 0  MAB/TAB/Ectopic: 1 2017  Living: 3    Last Menstrual Period: late January/early February    Name of GYN Physician: Dr. Monahan  States that she had fibroids in , but has been told they were resolved more recently  Denies ovarian cysts, STI, endometriosis, abnl paps     PAST MEDICAL & SURGICAL HISTORY:  Gestational diabetes mellitus (GDM), antepartum, gestational diabetes method of control unspecified  Newly diagnosed heart murur  No significant past surgical history      REVIEW OF SYSTEMS  General: +dizziness, fatigue, weakness. Denies fevers, chills, sweat	  ENMT: Denies nasal congestion, sinus pressure  Respiratory and Thorax: +cough w/sputum production, dyspnea, ONEILL, orthopnea  Cardiovascular:	denies CP, palpitations  Gastrointestinal:	denies N/V, abd pain  Genitourinary: +vaginal bleeding, urinary urgency.    MEDICATIONS  (STANDING):    MEDICATIONS  (PRN):      Allergies  No Known Allergies    SOCIAL HISTORY:    FAMILY HISTORY:  No pertinent family history in first degree relatives      Vital Signs Last 24 Hrs  T(C): 36.7 (2019 15:13), Max: 36.7 (2019 15:13)  T(F): 98 (2019 15:13), Max: 98 (2019 15:13)  HR: 84 (2019 18:31) (84 - 86)  BP: 122/68 (2019 18:31) (114/60 - 122/68)  BP(mean): --  RR: 18 (2019 18:31) (16 - 18)  SpO2: 100% (2019 18:31) (100% - 100%)    PHYSICAL EXAM:  Constitutional: alert and oriented x 3  Respiratory: CTA-B, symmetrical expansion  Cardiovascular: regular rate and rhythm  Gastrointestinal: soft, non tender, + bowel sounds. No hepatosplenomegaly, no palpable masses  Genitourinary: NEFG  Cervix: closed/ long, no CMT, mod dark red blood in posterior fornix, no active bleeding  Uterus: normal size, non tender  Adnexa: non tender, no palpable masses        LABS:                        5.3    4.96  )-----------( 525      ( 2019 18:41 )             20.9     02-24    137  |  102  |  9   ----------------------------<  95  4.3   |  22  |  0.71    Ca    9.0      2019 18:41    TPro  8.1  /  Alb  4.3  /  TBili  0.2  /  DBili  x   /  AST  31  /  ALT  10  /  AlkPhos  64  02-24    PT/INR - ( 2019 19:35 )   PT: 10.9 SEC;   INR: 0.96          PTT - ( 2019 19:35 )  PTT:25.5 SEC      RADIOLOGY & ADDITIONAL STUDIES:

## 2019-02-24 NOTE — CONSULT NOTE ADULT - ASSESSMENT
35 yo 35 yo  female 37 yo  female presents with CC of worsening dyspnea, found to have acute blood loss anemia 2/2 vaginal bleeding x 3 days. VS non-tachycardic and normotensive. Physical exam significant for non-active vaginal bleeding. TVUS significant for R ovarian cyst, but no acute uterine pathology.

## 2019-02-24 NOTE — ED CDU PROVIDER INITIAL DAY NOTE - OBJECTIVE STATEMENT
38F with no sig pmh here with 2 months of ONEILL worsening over last 2 days. P reports subj fever, lightheadedness and fatigue. also with nonproductive cough x 1 week. Pt is schedule to see a cardiologist in next 1 week. Denies cp, calf pain, hx of PE/DVT, recent surgeries or travel or OCP use. 3 kids at home positive for flu.    CDU CHRISTI HONG - Agree with above. Pt also endorses heavy irregular menses. Pt states she has been having menstrual period intermittently since december, most recently last 3 weeks have been pasing clots. Today changed 12 pads.

## 2019-02-24 NOTE — ED PROVIDER NOTE - CLINICAL SUMMARY MEDICAL DECISION MAKING FREE TEXT BOX
likely viral syndrome/flu. will r/o PNA,PTX, pericarditis/myocarditis, cardiomyopathy (2 months of dyspnea). Low suspicion for PE given no leg swelling, pleuritic pain  and PERC negative currently. Plan for EKG, POCT cardiac US, CXR, labs, flu test, pain control, likely outpatient f/u.

## 2019-02-25 VITALS
OXYGEN SATURATION: 100 % | DIASTOLIC BLOOD PRESSURE: 56 MMHG | SYSTOLIC BLOOD PRESSURE: 107 MMHG | TEMPERATURE: 98 F | RESPIRATION RATE: 16 BRPM | HEART RATE: 60 BPM

## 2019-02-25 LAB
BASOPHILS # BLD AUTO: 0.02 K/UL — SIGNIFICANT CHANGE UP (ref 0–0.2)
BASOPHILS NFR BLD AUTO: 0.3 % — SIGNIFICANT CHANGE UP (ref 0–2)
EOSINOPHIL # BLD AUTO: 0.13 K/UL — SIGNIFICANT CHANGE UP (ref 0–0.5)
EOSINOPHIL NFR BLD AUTO: 1.8 % — SIGNIFICANT CHANGE UP (ref 0–6)
HCT VFR BLD CALC: 26.9 % — LOW (ref 34.5–45)
HGB BLD-MCNC: 7.8 G/DL — LOW (ref 11.5–15.5)
IMM GRANULOCYTES NFR BLD AUTO: 0.4 % — SIGNIFICANT CHANGE UP (ref 0–1.5)
LYMPHOCYTES # BLD AUTO: 1.98 K/UL — SIGNIFICANT CHANGE UP (ref 1–3.3)
LYMPHOCYTES # BLD AUTO: 27.7 % — SIGNIFICANT CHANGE UP (ref 13–44)
MCHC RBC-ENTMCNC: 19 PG — LOW (ref 27–34)
MCHC RBC-ENTMCNC: 29 % — LOW (ref 32–36)
MCV RBC AUTO: 65.6 FL — LOW (ref 80–100)
MONOCYTES # BLD AUTO: 0.75 K/UL — SIGNIFICANT CHANGE UP (ref 0–0.9)
MONOCYTES NFR BLD AUTO: 10.5 % — SIGNIFICANT CHANGE UP (ref 2–14)
NEUTROPHILS # BLD AUTO: 4.25 K/UL — SIGNIFICANT CHANGE UP (ref 1.8–7.4)
NEUTROPHILS NFR BLD AUTO: 59.3 % — SIGNIFICANT CHANGE UP (ref 43–77)
NRBC # FLD: 0 K/UL — LOW (ref 25–125)
PLATELET # BLD AUTO: 418 K/UL — HIGH (ref 150–400)
PMV BLD: 10 FL — SIGNIFICANT CHANGE UP (ref 7–13)
RBC # BLD: 4.1 M/UL — SIGNIFICANT CHANGE UP (ref 3.8–5.2)
RBC # FLD: 26.6 % — HIGH (ref 10.3–14.5)
WBC # BLD: 7.16 K/UL — SIGNIFICANT CHANGE UP (ref 3.8–10.5)
WBC # FLD AUTO: 7.16 K/UL — SIGNIFICANT CHANGE UP (ref 3.8–10.5)

## 2019-02-25 PROCEDURE — 99217: CPT

## 2019-02-25 RX ORDER — MEDROXYPROGESTERONE ACETATE 150 MG/ML
5 INJECTION, SUSPENSION, EXTENDED RELEASE INTRAMUSCULAR DAILY
Qty: 0 | Refills: 0 | Status: DISCONTINUED | OUTPATIENT
Start: 2019-02-25 | End: 2019-02-28

## 2019-02-25 RX ORDER — MEDROXYPROGESTERONE ACETATE 150 MG/ML
1 INJECTION, SUSPENSION, EXTENDED RELEASE INTRAMUSCULAR
Qty: 6 | Refills: 0 | OUTPATIENT
Start: 2019-02-25 | End: 2019-03-02

## 2019-02-25 RX ADMIN — MEDROXYPROGESTERONE ACETATE 5 MILLIGRAM(S): 150 INJECTION, SUSPENSION, EXTENDED RELEASE INTRAMUSCULAR at 05:51

## 2019-02-25 NOTE — ED CDU PROVIDER SUBSEQUENT DAY NOTE - ATTENDING CONTRIBUTION TO CARE
Patient is a 39 yo F here for 3 weeks of vaginal bleeding with clots. Patient had an US which was negative for acute pathology or source of bleed. Patient was seen by GYN, started on Depo. Patient is also flu positive and started on tamiflu. She was transfused 2 unit's of pRBC's for anemia. Hgb 5.3, pending repeat cbc.     VS noted  Gen. no acute distress, Non toxic   HEENT: EOMI, mmm  Lungs: CTAB/L no C/ W /R   CVS: RRR   Abd; Soft non tender, non distended   Ext: no edema  Skin: no rash  Neuro AAOx3 non focal clear speech  a/p: anemia, s/p transfusion 2 units of pRBC. Patient still feeling mildly dizzy but states she didn't get much sleep. She states vaginal bleeding has decreased, almost stopped. Repeat Hgb is 7.8. GYN reassessed patient and she is cleared for discharge.   Ifeanyi Thompson MD

## 2019-02-25 NOTE — PROGRESS NOTE ADULT - ASSESSMENT
39 yo  F HD#2 presenting with symptomatic anemia now s/p 2u prbc, HD stable  -Provera 5mg QD to stabilize EM and control VB  -f/u post transfusion CBC around 8:30AM  -if CBC appropriate patient is stable for d/c home with outpatient follow up and management for AUB  -continue Tamiflu as prescribed    N Sample, PGY4  [id number]

## 2019-02-25 NOTE — ED CDU PROVIDER SUBSEQUENT DAY NOTE - PROGRESS NOTE DETAILS
CDU CHRISTI Hyman: Received signout on patient- seen and examined this morning with attending. Patient rested comfortably overnight. rpt cbc with appropiate rise from 5.3 >7.8. pt feels well, as per patient vaginal bleeding improved,slowed down since receiving the provera. Pt being treated with tamiflu. Pt received one dose of tamiflu and one dose of provera, will dc with 6 more days of both. Patient is improved and stable for dc home with followup with PMD/ OBGYN. VSS.

## 2019-02-25 NOTE — ED CDU PROVIDER SUBSEQUENT DAY NOTE - HISTORY
CDU CHRISTI HONG - Pt resting comfortably, asleep. No complaints. VSS. Receiving 2nd U PRBC. Rpt cbc in am pending. Will continue to observe and reassess in am

## 2019-02-25 NOTE — ED CDU PROVIDER DISPOSITION NOTE - CLINICAL COURSE
Patient is a 37 yo F here for 3 weeks of vaginal bleeding with clots. Patient had an US which was negative for acute pathology or source of bleed. Patient was seen by GYN, started on Depo. Patient is also flu positive and started on tamiflu. She was transfused 2 unit's of pRBC's for anemia. Hgb 5.3, pending repeat cbc.  anemia, s/p transfusion 2 units of pRBC. She states vaginal bleeding has decreased, almost stopped. Repeat Hgb is 7.8. GYN reassessed patient and she is cleared for discharge.

## 2019-02-25 NOTE — PROGRESS NOTE ADULT - SUBJECTIVE AND OBJECTIVE BOX
R4 GYN Note, HD#1 R4 GYN Note, HD#1    Patient seen and examined at bedside. Transfused 2uprbc overnight for symptomatic anemia. VB has since lightened per patient. Still c/o mild dizziness. +OOB. Tolerating reg diet, no N/V. Denies chest pain and palpitations. Denies subjective fevers and chills.     Vital Signs Last 24 Hrs  T(C): 36.5 (24 Feb 2019 21:40), Max: 36.7 (24 Feb 2019 15:13)  T(F): 97.7 (24 Feb 2019 21:40), Max: 98 (24 Feb 2019 15:13)  HR: 98 (24 Feb 2019 21:40) (84 - 98)  BP: 117/70 (24 Feb 2019 21:40) (114/60 - 122/68)  RR: 15 (24 Feb 2019 21:40) (15 - 18)  SpO2: 99% (24 Feb 2019 21:40) (99% - 100%)    Gen: NAD  CV: RRR  Lungs: clear bilaterally  Abd: soft, non tender, non distended  : mild VB on pad (last changed overnight)  Ext: NTTP bilaterally, no edema      LABS:                        5.3    4.96  )-----------( 525      ( 24 Feb 2019 18:41 )             20.9     02-24    137  |  102  |  9   ----------------------------<  95  4.3   |  22  |  0.71    Ca    9.0      24 Feb 2019 18:41    TPro  8.1  /  Alb  4.3  /  TBili  0.2  /  DBili  x   /  AST  31  /  ALT  10  /  AlkPhos  64  02-24      PT/INR - ( 24 Feb 2019 19:35 )   PT: 10.9 SEC;   INR: 0.96          PTT - ( 24 Feb 2019 19:35 )  PTT:25.5 SEC        MEDICATIONS  (STANDING):  medroxyPROGESTERone 5 milliGRAM(s) Oral daily    MEDICATIONS  (PRN): R4 GYN Note, HD#2    Patient seen and examined at bedside. Transfused 2uprbc overnight for symptomatic anemia. VB has since lightened per patient. Still c/o mild dizziness. +OOB. Tolerating reg diet, no N/V. Denies chest pain and palpitations. Denies subjective fevers and chills.     Vital Signs Last 24 Hrs  T(C): 36.5 (24 Feb 2019 21:40), Max: 36.7 (24 Feb 2019 15:13)  T(F): 97.7 (24 Feb 2019 21:40), Max: 98 (24 Feb 2019 15:13)  HR: 98 (24 Feb 2019 21:40) (84 - 98)  BP: 117/70 (24 Feb 2019 21:40) (114/60 - 122/68)  RR: 15 (24 Feb 2019 21:40) (15 - 18)  SpO2: 99% (24 Feb 2019 21:40) (99% - 100%)    Gen: NAD  CV: RRR  Lungs: clear bilaterally  Abd: soft, non tender, non distended  : mild VB on pad (last changed overnight)  Ext: NTTP bilaterally, no edema      LABS:                        5.3    4.96  )-----------( 525      ( 24 Feb 2019 18:41 )             20.9     02-24    137  |  102  |  9   ----------------------------<  95  4.3   |  22  |  0.71    Ca    9.0      24 Feb 2019 18:41    TPro  8.1  /  Alb  4.3  /  TBili  0.2  /  DBili  x   /  AST  31  /  ALT  10  /  AlkPhos  64  02-24      PT/INR - ( 24 Feb 2019 19:35 )   PT: 10.9 SEC;   INR: 0.96          PTT - ( 24 Feb 2019 19:35 )  PTT:25.5 SEC        MEDICATIONS  (STANDING):  medroxyPROGESTERone 5 milliGRAM(s) Oral daily    MEDICATIONS  (PRN):

## 2019-02-25 NOTE — ED CDU PROVIDER DISPOSITION NOTE - NSFOLLOWUPINSTRUCTIONS_ED_ALL_ED_FT
Follow up with your Primary Medical Doctor/ OBGYN within 2-3days, or within the week. If you have issues obtaining follow up, please call: [phone] to obtain a doctor or specialist who takes your insurance in your area. Or you can follow in our OBGYN clinic call [phone] to make an appointment. If results or reports were given to you, show copies of your reports given to you.   Take Tamiflu 75mg, one tablet twice a day for 7 days.   Take Provera 5mg once a day for 6 more days.  Take all of your medications as previously prescribed.  If worsening, new or concerning symptoms such as lightheadedness/ dizziness, shortness of breath, chest pain, or any other concerning symptoms return to the ED

## 2019-03-18 ENCOUNTER — RESULT REVIEW (OUTPATIENT)
Age: 39
End: 2019-03-18

## 2019-03-28 PROBLEM — O24.419 GESTATIONAL DIABETES MELLITUS IN PREGNANCY, UNSPECIFIED CONTROL: Chronic | Status: ACTIVE | Noted: 2019-02-24

## 2019-03-29 ENCOUNTER — OUTPATIENT (OUTPATIENT)
Dept: OUTPATIENT SERVICES | Facility: HOSPITAL | Age: 39
LOS: 1 days | End: 2019-03-29

## 2019-03-29 VITALS
RESPIRATION RATE: 16 BRPM | SYSTOLIC BLOOD PRESSURE: 106 MMHG | DIASTOLIC BLOOD PRESSURE: 70 MMHG | TEMPERATURE: 98 F | OXYGEN SATURATION: 98 % | HEART RATE: 77 BPM | WEIGHT: 128.09 LBS | HEIGHT: 59 IN

## 2019-03-29 DIAGNOSIS — N92.0 EXCESSIVE AND FREQUENT MENSTRUATION WITH REGULAR CYCLE: ICD-10-CM

## 2019-03-29 DIAGNOSIS — D64.9 ANEMIA, UNSPECIFIED: ICD-10-CM

## 2019-03-29 LAB
BLD GP AB SCN SERPL QL: NEGATIVE — SIGNIFICANT CHANGE UP
HCG SERPL-ACNC: < 5 MIU/ML — SIGNIFICANT CHANGE UP
HCT VFR BLD CALC: 36.6 % — SIGNIFICANT CHANGE UP (ref 34.5–45)
HGB BLD-MCNC: 10.5 G/DL — LOW (ref 11.5–15.5)
MCHC RBC-ENTMCNC: 22.1 PG — LOW (ref 27–34)
MCHC RBC-ENTMCNC: 28.7 % — LOW (ref 32–36)
MCV RBC AUTO: 77.1 FL — LOW (ref 80–100)
NRBC # FLD: 0 K/UL — SIGNIFICANT CHANGE UP (ref 0–0)
PLATELET # BLD AUTO: 447 K/UL — HIGH (ref 150–400)
PMV BLD: 9.8 FL — SIGNIFICANT CHANGE UP (ref 7–13)
RBC # BLD: 4.75 M/UL — SIGNIFICANT CHANGE UP (ref 3.8–5.2)
RBC # FLD: SIGNIFICANT CHANGE UP % (ref 10.3–14.5)
RH IG SCN BLD-IMP: POSITIVE — SIGNIFICANT CHANGE UP
WBC # BLD: 6.18 K/UL — SIGNIFICANT CHANGE UP (ref 3.8–10.5)
WBC # FLD AUTO: 6.18 K/UL — SIGNIFICANT CHANGE UP (ref 3.8–10.5)

## 2019-03-29 RX ORDER — SODIUM CHLORIDE 9 MG/ML
1000 INJECTION, SOLUTION INTRAVENOUS
Qty: 0 | Refills: 0 | Status: DISCONTINUED | OUTPATIENT
Start: 2019-04-10 | End: 2019-04-25

## 2019-03-29 NOTE — H&P PST ADULT - RS GEN PE MLT RESP DETAILS PC
no rhonchi/no wheezes/airway patent/clear to auscultation bilaterally/good air movement/respirations non-labored/no rales

## 2019-03-29 NOTE — H&P PST ADULT - GEN GEN HX ROS MEA POS PC
malaise/weakness/low hgb, diagnsoed with anemia in ER low hgb, diagnosed with anemia in ER/weakness/malaise

## 2019-03-29 NOTE — H&P PST ADULT - NSICDXPASTMEDICALHX_GEN_ALL_CORE_FT
PAST MEDICAL HISTORY:  Anemia received 2 units of PRBC in Feb 2019    Gestational diabetes mellitus (GDM), antepartum, gestational diabetes method of control unspecified     Menorrhagia

## 2019-03-29 NOTE — H&P PST ADULT - HISTORY OF PRESENT ILLNESS
39 yrs old female with h/o irregular bleeding and menorrhagia  for 2 months. Pt stated that she went to [facility] ER for SOB and it was noted that her Hbg was 5.6 and pt got 2 units of PRBC on Feb 25th , 2019. Pt presents for preop eval to have hysteroscopy with Ish on 4/10/19.

## 2019-03-29 NOTE — H&P PST ADULT - ASSESSMENT
39 yrs old female with menorrhagia presents for preop eval to have hysteroscopy with saray on 4/10/19.

## 2019-03-29 NOTE — H&P PST ADULT - NSANTHOSAYNRD_GEN_A_CORE
No. SILVIA screening performed.  STOP BANG Legend: 0-2 = LOW Risk; 3-4 = INTERMEDIATE Risk; 5-8 = HIGH Risk/never tested

## 2019-03-29 NOTE — H&P PST ADULT - NSICDXPROBLEM_GEN_ALL_CORE_FT
PROBLEM DIAGNOSES  Problem: Menorrhagia  Assessment and Plan: PROBLEM DIAGNOSES  Problem: Menorrhagia  Assessment and Plan: Pt scheduled to have hysteroscopy with saray on 4/10/19.  abs pending.  Preop instructions provided to pt.   famotidine provided to pt with instructions.

## 2019-04-09 ENCOUNTER — TRANSCRIPTION ENCOUNTER (OUTPATIENT)
Age: 39
End: 2019-04-09

## 2019-04-09 DIAGNOSIS — Z3A.00 WEEKS OF GESTATION OF PREGNANCY NOT SPECIFIED: ICD-10-CM

## 2019-04-09 NOTE — ASU PATIENT PROFILE, ADULT - PMH
Anemia  received 2 units of PRBC in Feb 2019  Gestational diabetes mellitus (GDM), antepartum, gestational diabetes method of control unspecified    Menorrhagia

## 2019-04-09 NOTE — ASU PATIENT PROFILE, ADULT - SPIRITUAL CULTURAL, CURRENT SITUATION, PROFILE
"Drain question    Reason for Disposition   [1] Pus or bad-smelling fluid draining from incision AND [2] no fever    Answer Assessment - Initial Assessment Questions  1. SYMPTOM: "What's the main symptom you're concerned about?" (e.g., redness, pain, drainage)      Drainage and air leak when drain site is palpated  2. ONSET: "When did ________  start?"      -  3. SURGERY: "What surgery was performed?"      Perforated abscess, drain placement  4. DATE of SURGERY: "When was surgery performed?"       12/22/17  5. INCISION SITE: "Where is the incision located?"       abdomen  6. REDNESS: "Is there any redness at the incision site?" If yes, ask: "How wide across is the redness?" (Inches, centimeters)       no  7. PAIN: "Is there any pain?" If so, ask: "How bad is it?"  (Scale 1-10; or mild, moderate, severe)      no  8. BLEEDING: "Is there any bleeding?" If so, ask: "How much?" and "Where?"      no  9. DRAINAGE: "Is there any drainage from the incision site?" If yes, ask: "What color and how much?" (e.g., red, cloudy, pus; drops, teaspoon)      Small amount of pus  10. FEVER: "Do you have a fever?" If so, ask: "What is your temperature, how was it measured, and when did it start?"        no  11. OTHER SYMPTOMS: "Do you have any other symptoms?" (e.g., shaking chills, weakness, rash elsewhere on body)        no    Protocols used: ST POST-OP INCISION SYMPTOMS-A-AH      " none

## 2019-04-09 NOTE — ASU PATIENT PROFILE, ADULT - ABLE TO REACH PT
left message on machine/no yes/left message on machinespoke with pt. and pt. verbalizes good understanding of  instructions

## 2019-04-10 ENCOUNTER — RESULT REVIEW (OUTPATIENT)
Age: 39
End: 2019-04-10

## 2019-04-10 ENCOUNTER — OUTPATIENT (OUTPATIENT)
Dept: OUTPATIENT SERVICES | Facility: HOSPITAL | Age: 39
LOS: 1 days | Discharge: HOME | End: 2019-04-10
Payer: COMMERCIAL

## 2019-04-10 VITALS
HEIGHT: 59 IN | WEIGHT: 128.09 LBS | TEMPERATURE: 98 F | RESPIRATION RATE: 16 BRPM | HEART RATE: 66 BPM | SYSTOLIC BLOOD PRESSURE: 133 MMHG | OXYGEN SATURATION: 100 % | DIASTOLIC BLOOD PRESSURE: 57 MMHG

## 2019-04-10 VITALS — RESPIRATION RATE: 16 BRPM | OXYGEN SATURATION: 98 % | HEART RATE: 62 BPM

## 2019-04-10 DIAGNOSIS — D64.9 ANEMIA, UNSPECIFIED: ICD-10-CM

## 2019-04-10 DIAGNOSIS — Z3A.00 WEEKS OF GESTATION OF PREGNANCY NOT SPECIFIED: ICD-10-CM

## 2019-04-10 LAB
BLD GP AB SCN SERPL QL: NEGATIVE — SIGNIFICANT CHANGE UP
HCG UR QL: NEGATIVE — SIGNIFICANT CHANGE UP
RH IG SCN BLD-IMP: POSITIVE — SIGNIFICANT CHANGE UP

## 2019-04-10 PROCEDURE — 88305 TISSUE EXAM BY PATHOLOGIST: CPT | Mod: 26

## 2019-04-10 RX ORDER — SODIUM CHLORIDE 9 MG/ML
1000 INJECTION, SOLUTION INTRAVENOUS
Qty: 0 | Refills: 0 | Status: DISCONTINUED | OUTPATIENT
Start: 2019-04-10 | End: 2019-04-25

## 2019-04-10 NOTE — ASU DISCHARGE PLAN (ADULT/PEDIATRIC) - CARE PROVIDER_API CALL
Callie Monahan)  Obstetrics and Gynecology  [address], [address]  [place] [address]  Phone: [phone]  Fax: [phone]  Follow Up Time:

## 2019-04-10 NOTE — ASU DISCHARGE PLAN (ADULT/PEDIATRIC) - CALL YOUR DOCTOR IF YOU HAVE ANY OF THE FOLLOWING:
Nausea and vomiting that does not stop/Fever greater than (need to indicate Fahrenheit or Celsius)/Inability to tolerate liquids or foods/Pain not relieved by Medications/Unable to urinate/Increased irritability or sluggishness/Bleeding that does not stop

## 2019-04-10 NOTE — ASU DISCHARGE PLAN (ADULT/PEDIATRIC) - SHOWER ONLY DURATION DAY(S)
for 2 weeks "I took my normal dose of boost bar and I felt like I became drunk and disoriented and I feel much better now, my doctor from [facility] told me to come to ER and I went to [facility] and got tired of waiting 3hrs so I came here"

## 2019-04-19 LAB — SURGICAL PATHOLOGY STUDY: SIGNIFICANT CHANGE UP

## 2019-08-18 NOTE — PROGRESS NOTE ADULT - PROBLEM SELECTOR PLAN 1
- Continue motrin, tylenol, oxycodone PRN for pain control.  - Increase ambulation  - Continue regular diet  - Discharge planning
- Continue regular diet.  - Increase ambulation.  - Continue motrin, tylenol, oxycodone PRN for pain control.
-Encourage Ambulation  -Continue with regular diet  -Heparin, SCDs, and ambulation for DVT ppx  -Discontinue ovalle  -Analgesia as needed
patient

## 2020-07-08 NOTE — ED CDU PROVIDER SUBSEQUENT DAY NOTE - MEDICAL DECISION MAKING DETAILS
Returned patent's call, left message on machine. pt with lightheadedness, cough, quiroz, heavy vaginal bleeding; anemic hg 5.3 and flu +; started on family and IVH and being transfused 2UPRBC in CDU, GYN consulted and requests aygestin 5mg QD x 7 days. Pt otherwise stable. Will continue to monitor and reassess pt with lightheadedness, cough, quiroz, heavy vaginal bleeding; anemic hg 5.3 and flu +; started on tamiflu and IVF and being transfused 2UPRBC in CDU, GYN consulted and requests aygestin 5mg QD x 7 days. Pt otherwise stable. Will continue to monitor and reassess

## 2020-10-21 NOTE — DISCHARGE NOTE OB - CASE MANAGER'S NAME
Outpatient/Observation goals to be met before discharge home:      -diagnostic tests and consults completed and resulted:met  -vital signs normal or at patient baseline: Met  -adequate pain control on oral analgesics: met  -safe disposition plan has been identified:met    Pt tolerated regular diet. AVSS, pain controlled with schedule and PRN meds.     BP (!) 146/89 (BP Location: Left arm)   Pulse 98   Temp 96.9  F (36.1  C) (Oral)   Resp 18   SpO2 98%       PHILLIP Frazier RN CM

## 2021-02-17 NOTE — ASU PREOP CHECKLIST - BLOOD AVAILABLE
[facility] Emergency Department  [address], [place] [address]  [phone]     Clinical Summary     PERSON INFORMATION   Name DIANA SALAZAR Age   4 Years  2014 1:08 PM   Acct# [id number] Sex Male Phone [phone]   Dispo Type Home - (i.e. Home on oxygen, DME)-  Arrival 2017 10:52 AM Checkout 2017 11:57 AM    Address: [address] [facility] [address]      Visit Reason COUGH     ED Physician Note     ED Time Seen By Provider Entered On:  2017 11:38     Performed On:  2017 11:38  by Laura Box NP      Time Seen By Provider   Time Seen by Provider :   2017 11:38    Laura Box NP - 2017 11:38           VITALS INFORMATION  Vitals/Ht/Wt  Temperature Tympanic:  97.6 F  Peripheral Pulse Rate:  114 bpm  Respiratory Rate:  26 br/min  Oxygen Saturation:  95 %  Oxygen Therapy:  Room air  Height:  0.0 cm  Weight:  16.2 kg       MEDICAL INFORMATION   Allergy Info:          NKA             Prescriptions:              DISCHARGE INFORMATION   Discharge Disposition: Home - (i.e. Home on oxygen, DME)-      PATIENT EDUCATION INFORMATION   Instructions:       Upper Respiratory Infection, Pediatric(Polish)   Follow up:                With: Address: When:   Follow up with primary care provider     Comments:   Call for follow up appointment in 2-3 days to continue to monitor, keep hydrated, can provide tylenol/ibuprofen as needed, help with cleaning passages as discussed   Return if symptoms worsen   SOLICITE ATENCIÓN MÉDICA SI:   ?  El reha tiene fiebre.   ?  Los ojos están rojos y presentan cody secreción amarillenta.   ?  Se dougie costras en la piel debajo de la nariz.   ?  El rhea se queja de dolor en los oídos o en la garganta, aparece cody erupción o se tironea repetidamente de la oreja               DIAGNOSIS          
no

## 2021-06-07 NOTE — ASU PREOP CHECKLIST - NSWEIGHTCALCTOOLDRUG_GEN_A_CORE
Controlled Substance Refill Request  Medication Name:   Requested Prescriptions     Pending Prescriptions Disp Refills     oxyCODONE (ROXICODONE) 5 MG immediate release tablet 42 tablet 0     Sig: Take 1 tablet (5 mg total) by mouth every 4 (four) hours as needed for pain.     Date Last Fill: 4/15/20  Requested Pharmacy: [email or web address]  Submit electronically to pharmacy  Controlled Substance Agreement on file:   Encounter-Level CSA Scan Date:    There are no encounter-level csa scan date.        Last office visit:  2/13/20        used

## 2022-06-07 NOTE — DISCHARGE NOTE ANTEPARTUM - CARE PROVIDERS DIRECT ADDRESSES
CLAY patient in person for EGD. Scheduled 11/10/2022 with arrival time 11:00am. Prep packet handed to patient in office. Patient advised arrival time may vary based on [facility] guidelines. CLAY More     [email or web address]

## 2022-06-16 NOTE — H&P PST ADULT - NEGATIVE RESPIRATORY AND THORAX SYMPTOMS
Subjective:   Janessa Cain is a 87 y.o. female who presents today with   Chief Complaint   Patient presents with   • UTI     Discomfort, pain with urination,  4x days        UTI  This is a new problem. Episode onset: 4 days. The problem occurs constantly. The problem has been unchanged. Associated symptoms include urinary symptoms. Pertinent negatives include no chills or fever. She has tried nothing for the symptoms. The treatment provided no relief.     PMH:  has a past medical history of 2019 novel coronavirus disease (COVID-19) (10/06/2021), Depression (6/2/2020), Hypertension, Hypoalbuminemia (11/11/2019), Multiple open wounds of lower leg (11/3/2019), Nocturia (6/25/2021), Pneumonia, Rheumatic fever (1943), and Urinary retention (11/13/2019).  MEDS:   Current Outpatient Medications:   •  cefdinir (OMNICEF) 300 MG Cap, Take 1 Capsule by mouth every 12 hours for 5 days., Disp: 10 Capsule, Rfl: 0  •  valsartan-hydrochlorothiazide (DIOVAN-HCT) 160-12.5 MG per tablet, TAKE ONE TABLET BY MOUTH DAILY, Disp: 90 Tablet, Rfl: 1  •  docusate sodium 100 MG Cap, Take 100 mg by mouth 2 times a day., Disp: 60 Capsule, Rfl: 0  •  traZODone (DESYREL) 50 MG Tab, TAKE ONE TABLET BY MOUTH DAILY AS NEEDED FOR SLEEP, Disp: 90 Tablet, Rfl: 2  •  DULoxetine (CYMBALTA) 30 MG Cap DR Particles, Take 1 Capsule by mouth every day., Disp: 30 Capsule, Rfl: 3  •  estradiol (ESTRACE) 0.5 MG tablet, TAKE ONE TABLET BY MOUTH DAILY (Patient taking differently: 0.25 mg every day.), Disp: 30 Tablet, Rfl: 2  •  omeprazole (PRILOSEC) 20 MG delayed-release capsule, Take 1 Capsule by mouth every day., Disp: 30 Capsule, Rfl: 1  •  acetaminophen (TYLENOL) 325 MG Tab, Take 650 mg by mouth every four hours as needed., Disp: , Rfl:   ALLERGIES:   Allergies   Allergen Reactions   • Apap-Fd&C Red #40 [place]-Oxycodone    • Percocet [Perloxx]    • Hydrocodone Vomiting   • Oxycodone    • Tramadol      SURGHX:   Past Surgical History:  "  Procedure Laterality Date   • PB TOTAL KNEE ARTHROPLASTY Right 2/28/2022    Procedure: ARTHROPLASTY, KNEE, TOTAL;  Surgeon: Pauline Mo M.D.;  Location: SURGERY [facility];  Service: Orthopedics   • CO TOTAL HIP ARTHROPLASTY Right 11/6/2019    Procedure: ARTHROPLASTY, HIP, TOTAL;  Surgeon: Pauline Mo M.D.;  Location: [facility];  Service: Orthopedics   • HIP ARTHROPLASTY TOTAL Left     \"resurfacing\"   • TONSILLECTOMY       SOCHX:  reports that she quit smoking about 38 years ago. She has a 30.00 pack-year smoking history. She has never used smokeless tobacco. She reports previous alcohol use of about 0.6 oz of alcohol per week. She reports that she does not use drugs.  FH: Reviewed with patient, not pertinent to this visit.     Review of Systems   Constitutional: Negative for chills and fever.   Genitourinary: Positive for dysuria. Negative for flank pain, frequency, hematuria and urgency.      Objective:   /82 (BP Location: Right arm, Patient Position: Sitting, BP Cuff Size: Adult)   Pulse 86   Temp 36.6 °C (97.8 °F) (Temporal)   Resp 20   Ht 1.645 m (5' 4.75\")   Wt 79.8 kg (176 lb)   SpO2 93%   BMI 29.51 kg/m²   Physical Exam  Vitals and nursing note reviewed.   Constitutional:       General: She is not in acute distress.     Appearance: Normal appearance. She is well-developed. She is not ill-appearing or toxic-appearing.   HENT:      Head: Normocephalic and atraumatic.      Right Ear: Hearing normal.      Left Ear: Hearing normal.   Eyes:      Pupils: Pupils are equal, round, and reactive to light.   Cardiovascular:      Rate and Rhythm: Normal rate and regular rhythm.      Heart sounds: Normal heart sounds.   Pulmonary:      Effort: Pulmonary effort is normal.      Breath sounds: Normal breath sounds.   Abdominal:      Tenderness: There is no abdominal tenderness. There is no right CVA tenderness or left CVA tenderness.   Genitourinary:     Comments: Patient deferred  " exam  Musculoskeletal:      Comments: Normal movement in all 4 extremities   Skin:     General: Skin is warm and dry.   Neurological:      Mental Status: She is alert.      Coordination: Coordination normal.   Psychiatric:         Mood and Affect: Mood normal.       UA consistent with infection  Assessment/Plan:   Assessment    1. Dysuria  - POCT Urinalysis    2. Acute cystitis without hematuria  - Urine Culture; Future  - cefdinir (OMNICEF) 300 MG Cap; Take 1 Capsule by mouth every 12 hours for 5 days.  Dispense: 10 Capsule; Refill: 0  Symptoms and presentation are consistent with urinary tract infection we will treat accordingly with antibiotics at this time.  Differential diagnosis, natural history, supportive care, and indications for immediate follow-up discussed.   Patient given instructions and understanding of medications and treatment.    If not improving in 3-5 days, F/U with PCP or return to UC if symptoms worsen.    Patient agreeable to plan.    Please note that this dictation was created using voice recognition software. I have made every reasonable attempt to correct obvious errors, but I expect that there are errors of grammar and possibly content that I did not discover before finalizing the note.    Herbert Boswell PA-C     no wheezing/no dyspnea/no cough

## 2022-09-18 ENCOUNTER — EMERGENCY (EMERGENCY)
Facility: HOSPITAL | Age: 42
LOS: 1 days | Discharge: HOME | End: 2022-09-18
Attending: EMERGENCY MEDICINE | Admitting: EMERGENCY MEDICINE

## 2022-09-18 VITALS
SYSTOLIC BLOOD PRESSURE: 138 MMHG | TEMPERATURE: 98 F | OXYGEN SATURATION: 100 % | HEIGHT: 59 IN | DIASTOLIC BLOOD PRESSURE: 78 MMHG | RESPIRATION RATE: 18 BRPM | HEART RATE: 89 BPM

## 2022-09-18 PROBLEM — N92.0 EXCESSIVE AND FREQUENT MENSTRUATION WITH REGULAR CYCLE: Chronic | Status: ACTIVE | Noted: 2019-03-29

## 2022-09-18 LAB
ALBUMIN SERPL ELPH-MCNC: 4.5 G/DL — SIGNIFICANT CHANGE UP (ref 3.3–5)
ALP SERPL-CCNC: 68 U/L — SIGNIFICANT CHANGE UP (ref 40–120)
ALT FLD-CCNC: 9 U/L — SIGNIFICANT CHANGE UP (ref 4–33)
ANION GAP SERPL CALC-SCNC: 11 MMOL/L — SIGNIFICANT CHANGE UP (ref 7–14)
ANISOCYTOSIS BLD QL: SLIGHT — SIGNIFICANT CHANGE UP
APTT BLD: 27.5 SEC — SIGNIFICANT CHANGE UP (ref 27–36.3)
AST SERPL-CCNC: 15 U/L — SIGNIFICANT CHANGE UP (ref 4–32)
BASOPHILS # BLD AUTO: 0.06 K/UL — SIGNIFICANT CHANGE UP (ref 0–0.2)
BASOPHILS NFR BLD AUTO: 0.9 % — SIGNIFICANT CHANGE UP (ref 0–2)
BILIRUB SERPL-MCNC: 0.2 MG/DL — SIGNIFICANT CHANGE UP (ref 0.2–1.2)
BLD GP AB SCN SERPL QL: NEGATIVE — SIGNIFICANT CHANGE UP
BUN SERPL-MCNC: 9 MG/DL — SIGNIFICANT CHANGE UP (ref 7–23)
CALCIUM SERPL-MCNC: 9.1 MG/DL — SIGNIFICANT CHANGE UP (ref 8.4–10.5)
CHLORIDE SERPL-SCNC: 104 MMOL/L — SIGNIFICANT CHANGE UP (ref 98–107)
CO2 SERPL-SCNC: 24 MMOL/L — SIGNIFICANT CHANGE UP (ref 22–31)
CREAT SERPL-MCNC: 0.65 MG/DL — SIGNIFICANT CHANGE UP (ref 0.5–1.3)
EGFR: 114 ML/MIN/1.73M2 — SIGNIFICANT CHANGE UP
EOSINOPHIL # BLD AUTO: 0.18 K/UL — SIGNIFICANT CHANGE UP (ref 0–0.5)
EOSINOPHIL NFR BLD AUTO: 2.8 % — SIGNIFICANT CHANGE UP (ref 0–6)
FLUAV AG NPH QL: SIGNIFICANT CHANGE UP
FLUBV AG NPH QL: SIGNIFICANT CHANGE UP
GIANT PLATELETS BLD QL SMEAR: PRESENT — SIGNIFICANT CHANGE UP
GLUCOSE SERPL-MCNC: 98 MG/DL — SIGNIFICANT CHANGE UP (ref 70–99)
HCG SERPL-ACNC: <5 MIU/ML — SIGNIFICANT CHANGE UP
HCT VFR BLD CALC: 20.9 % — CRITICAL LOW (ref 34.5–45)
HGB BLD-MCNC: 5.8 G/DL — CRITICAL LOW (ref 11.5–15.5)
HYPOCHROMIA BLD QL: SIGNIFICANT CHANGE UP
IANC: 3.65 K/UL — SIGNIFICANT CHANGE UP (ref 1.8–7.4)
INR BLD: 0.97 RATIO — SIGNIFICANT CHANGE UP (ref 0.88–1.16)
LYMPHOCYTES # BLD AUTO: 1.41 K/UL — SIGNIFICANT CHANGE UP (ref 1–3.3)
LYMPHOCYTES # BLD AUTO: 22.2 % — SIGNIFICANT CHANGE UP (ref 13–44)
MCHC RBC-ENTMCNC: 17.8 PG — LOW (ref 27–34)
MCHC RBC-ENTMCNC: 27.8 GM/DL — LOW (ref 32–36)
MCV RBC AUTO: 64.1 FL — LOW (ref 80–100)
MICROCYTES BLD QL: SIGNIFICANT CHANGE UP
MONOCYTES # BLD AUTO: 0.18 K/UL — SIGNIFICANT CHANGE UP (ref 0–0.9)
MONOCYTES NFR BLD AUTO: 2.8 % — SIGNIFICANT CHANGE UP (ref 2–14)
NEUTROPHILS # BLD AUTO: 4.53 K/UL — SIGNIFICANT CHANGE UP (ref 1.8–7.4)
NEUTROPHILS NFR BLD AUTO: 71.3 % — SIGNIFICANT CHANGE UP (ref 43–77)
OVALOCYTES BLD QL SMEAR: SLIGHT — SIGNIFICANT CHANGE UP
PLAT MORPH BLD: ABNORMAL
PLATELET # BLD AUTO: 422 K/UL — HIGH (ref 150–400)
PLATELET COUNT - ESTIMATE: NORMAL — SIGNIFICANT CHANGE UP
POIKILOCYTOSIS BLD QL AUTO: SLIGHT — SIGNIFICANT CHANGE UP
POTASSIUM SERPL-MCNC: 3.8 MMOL/L — SIGNIFICANT CHANGE UP (ref 3.5–5.3)
POTASSIUM SERPL-SCNC: 3.8 MMOL/L — SIGNIFICANT CHANGE UP (ref 3.5–5.3)
PROT SERPL-MCNC: 7.7 G/DL — SIGNIFICANT CHANGE UP (ref 6–8.3)
PROTHROM AB SERPL-ACNC: 11.2 SEC — SIGNIFICANT CHANGE UP (ref 10.5–13.4)
RBC # BLD: 3.26 M/UL — LOW (ref 3.8–5.2)
RBC # FLD: 18.6 % — HIGH (ref 10.3–14.5)
RBC BLD AUTO: ABNORMAL
RH IG SCN BLD-IMP: POSITIVE — SIGNIFICANT CHANGE UP
RH IG SCN BLD-IMP: POSITIVE — SIGNIFICANT CHANGE UP
RSV RNA NPH QL NAA+NON-PROBE: SIGNIFICANT CHANGE UP
SARS-COV-2 RNA SPEC QL NAA+PROBE: SIGNIFICANT CHANGE UP
SMUDGE CELLS # BLD: PRESENT — SIGNIFICANT CHANGE UP
SODIUM SERPL-SCNC: 139 MMOL/L — SIGNIFICANT CHANGE UP (ref 135–145)
WBC # BLD: 6.35 K/UL — SIGNIFICANT CHANGE UP (ref 3.8–10.5)
WBC # FLD AUTO: 6.35 K/UL — SIGNIFICANT CHANGE UP (ref 3.8–10.5)

## 2022-09-18 PROCEDURE — 93010 ELECTROCARDIOGRAM REPORT: CPT

## 2022-09-18 PROCEDURE — 99220: CPT | Mod: 25

## 2022-09-18 NOTE — ED CDU PROVIDER INITIAL DAY NOTE - MEDICAL DECISION MAKING DETAILS
41 yo F with PMH of fibroids s/p D&C sent in by ob/gyn for low hb yesterday, c/o dyspnea on exertion. In ED, Hb 5.8, pt sent to CDU for 2 units of blood transfusion; obgyn following.

## 2022-09-18 NOTE — ED CDU PROVIDER INITIAL DAY NOTE - MUSCULOSKELETAL, MLM
Spine appears normal, range of motion is not limited, no muscle or joint tenderness. no leg swelling b/l; no palpable cords b/l. no calf tenderness b/l.

## 2022-09-18 NOTE — ED CDU PROVIDER INITIAL DAY NOTE - NS ED ATTENDING STATEMENT MOD
This was a shared visit with the MARGARITA. I reviewed and verified the documentation and independently performed the documented:

## 2022-09-18 NOTE — ED PROVIDER NOTE - PHYSICAL EXAMINATION
Physical Exam:  Gen: awake alert   HEENT:  conjunctival pallor, oral mucosa moist  Lung: CTAB, no respiratory distress, no wheezes/rhonchi/rales B/L, speaking in full sentences  CV: RRR  Abd: soft, NT, ND, no guarding, no rigidity, no rebound tenderness  MSK: no visible deformities  Skin: Warm, well perfused, no rash, no leg swelling  ~Mitch Henry MD (PGY-3)

## 2022-09-18 NOTE — ED CDU PROVIDER INITIAL DAY NOTE - PROGRESS NOTE DETAILS
cdu su evans: pt resting comfortably in bed, receiving PRBC's, state sthat she feels better, will reasses

## 2022-09-18 NOTE — ED PROVIDER NOTE - PROGRESS NOTE DETAILS
Carl TALLEY (PGY-3)  spoke to gyn who will see pt in ED  pt ordered for 2u prbc at this time Carl TALLEY (PGY-3)  cdu will take pt for transfusion

## 2022-09-18 NOTE — ED ADULT NURSE NOTE - OBJECTIVE STATEMENT
pt ambulatory, aox4, from home. sent by PMD for low hgb. pt reports exertional dyspnea. no chest pain. mild fatigue. + lightheaded during ambulation. pt mild pallor skin. breathing even and unlabored. right ac20g inserted. stretcher in lowst position. pt placed on tele. pt reports her recent menstrual cycle lasted more than 2 weeks. hx of anemia, last blood transufion in 2019. will continue to monitor.

## 2022-09-18 NOTE — CONSULT NOTE ADULT - SUBJECTIVE AND OBJECTIVE BOX
Gyn Consult Note  ELISABET BALTAZAR  42y  Female [id number]    HPI: 43yo , with several weeks of on-and-off vaginal bleeding. Most recently stopped bleeding on . She denies vaginal bleeding at this time. Known history of fibroids and symptomatic anemia requiring transfusions. At this time, she reports several days of dizziness, lightheadedness, and shortness of breath with exertion and general fatigue.       Name of GYN Physician: Dr. Monahan    OBHx: 3x FT CS 2008,   GYNHx: H/o fibroids, requiring transfusions in the past  PMH: Denies  PSH: CSx3, D&C  Meds: Fe  All: NKDA    accepts blood    Physical Exam:   General: sitting comfortably in bed, NAD   Abd: Soft, non-tender, non-distended.  Bowel sounds present.    Ext: non-tender b/l, no edema     LABS:                              5.8    6.35  )-----------( 422      ( 18 Sep 2022 14:08 )             20.9         139  |  104  |  9   ----------------------------<  98  3.8   |  24  |  0.65    Ca    9.1      18 Sep 2022 14:08    TPro  7.7  /  Alb  4.5  /  TBili  0.2  /  DBili  x   /  AST  15  /  ALT  9   /  AlkPhos  68      I&O's Detail    PT/INR - ( 18 Sep 2022 14:08 )   PT: 11.2 sec;   INR: 0.97 ratio         PTT - ( 18 Sep 2022 14:08 )  PTT:27.5 sec

## 2022-09-18 NOTE — ED PROVIDER NOTE - OBJECTIVE STATEMENT
42F PMH fibroids s/p D&C sent in by ob/gyn. Had blood work done for heavy menstrual cycle (3 weeks of bleeding, stopped for 2 weeks, bled again for 3 weeks, now stopped bleeding 5 days ago). Had blood transfusion 2-3 yrs ago for heaving menstruation. Has exertional dyspnea and dizziness. No cp, abd pain, current vag bleeding, rectal bleeding, urinary symptoms, bruising, A/C use. Hb 6 outpt    GYN: Dr. Ev Ledesma

## 2022-09-18 NOTE — ED CDU PROVIDER INITIAL DAY NOTE - OBJECTIVE STATEMENT
42F PMH fibroids s/p D&C sent in by ob/gyn. Had blood work done for heavy menstrual cycle (3 weeks of bleeding, stopped for 2 weeks, bled again for 3 weeks, now stopped bleeding 5 days ago). Had blood transfusion 2-3 yrs ago for heaving menstruation. Has exertional dyspnea and dizziness. No cp, abd pain, current vag bleeding, rectal bleeding, urinary symptoms, bruising, A/C use. Hb 6 outpt    GYN: Dr. Ev Ledesma 42F PMH fibroids s/p D&C sent in by ob/gyn. Had blood work done for heavy menstrual cycle (3 weeks of bleeding, stopped for 2 weeks, bled again for 3 weeks, now stopped bleeding 5 days ago). Had blood transfusion 2-3 yrs ago for heaving menstruation. Has exertional dyspnea and dizziness. No cp, abd pain, current vag       CDU PA Mili: agrees w/ above.

## 2022-09-18 NOTE — ED ADULT TRIAGE NOTE - CHIEF COMPLAINT QUOTE
pt sent by PMD for low hemoglobin. pt states she has hx of irregular and long menstrual periods. denies pain/discomfort. denies cp. reports SOB with exertion "for quiet a while."

## 2022-09-18 NOTE — CONSULT NOTE ADULT - ASSESSMENT
A/P: 41yo  with known history of fibroids, however no vaginal bleeding for several days presenting with symptomatic anemia, H/H 5.8/20.9.   - Transfuse 2u pRBC  - Given patient has not had vaginal bleeding for several days, no role for pharmacologic treatment to suppress bleeding  - No acute GYN intervention at this time    D/w Dr. Carlin Blake, PGY2

## 2022-09-18 NOTE — ED PROVIDER NOTE - ATTENDING CONTRIBUTION TO CARE
The patient is a 42y Female who has a past medical and surgery history of FIbroids CSections Menorrhagia Fe++ anemia transfused in past Gestational diabetes mellitus (GDM), PTED at request of PCP with Hb of 6 in setting of 2 weeks of heavy menstrual bleeding which stopped 5 days ago with ONEILL and dizziness on exertion; No urinary symptoms/hematuria bruising or gum bleeding/rashes   Vital Signs Last 24 Hrs  T(F): 98.3 HR: 89 BP: 138/78 RR: 18 SpO2: 100% (18 Sep 2022 12:33)  PE: as described; my additions and exceptions are noted in the chart    DATA:  EKG: See results  LAB:                         5.8    6.35  )-----------( 422      ( 18 Sep 2022 14:08 )             20.9   Mean Cell Volume: 64.1 fL (09-18-22 @ 14:08)  PT: 11.2 sec;   INR: 0.97 ratio  PTT:27.5 [id number]    139  |  104  |  9   ----------------------------<  98  3.8   |  24  |  0.65    Ca    9.1      18 Sep 2022 14:08 TPro  7.7  /  Alb  4.5  /  TBili  0.2  /  DBili  x   /  AST  15  /  ALT  9   /  AlkPhos  68  09-18     IMPRESSION/RISK:  Dx= Symptomatic anemia    Consideration include Sent in  by PCP/GYN ; will have GYN see  Plan  as above  tx prbcs   probable CDU   f/u gyn reccs

## 2022-09-18 NOTE — ED CDU PROVIDER INITIAL DAY NOTE - CONSTITUTIONAL, MLM
normal... Well appearing, awake, alert, oriented to person, place, time/situation and in no apparent distress. No complaints

## 2022-09-18 NOTE — ED ADULT NURSE REASSESSMENT NOTE - NS ED NURSE REASSESS COMMENT FT1
pt resting, hand off report given to Ayesha HARRIS CDU, stable for transfer. pending PRBC transfusion.

## 2022-09-18 NOTE — ED CDU PROVIDER INITIAL DAY NOTE - ATTENDING APP SHARED VISIT CONTRIBUTION OF CARE
I have made the decision to admit this patient to the CDU as documented in my Provider note I have reviewed the note  written by oFrest MILLS, on that visit day. I have supervised and participated as necessary in the performance of procedures indicated for patient management and was available at all phases of the patient´s visit when needed.    Please see my provider note for the details of my decision to admit  Vital Signs Stable  PE unchanged at time of admission     IMPRESSION/RISK:  Dx= Symptomatic anemia    Admitted to CDU for PRBCs serial exams and GYN Reeval   Will reassess if stay uneventful probable d/c in late AM early afternoon  tx prbcs   probable CDU   f/u gyn reccs

## 2022-09-18 NOTE — ED PROVIDER NOTE - CLINICAL SUMMARY MEDICAL DECISION MAKING FREE TEXT BOX
42F PMH fibroids s/p D&C sent in by ob/gyn for anemia in setting of heaving menstrual cycles. VSS in ED currently. Abd NT, conjunctival pallor, lungs ctab  Likely symptomatic anemia from heavy vag bleeding. Will order labs, coags, type/screen, transfuse PRN, reassess symptoms

## 2022-09-19 VITALS
HEART RATE: 70 BPM | RESPIRATION RATE: 18 BRPM | TEMPERATURE: 98 F | DIASTOLIC BLOOD PRESSURE: 75 MMHG | SYSTOLIC BLOOD PRESSURE: 113 MMHG | OXYGEN SATURATION: 99 %

## 2022-09-19 LAB
BASOPHILS # BLD AUTO: 0.05 K/UL — SIGNIFICANT CHANGE UP (ref 0–0.2)
BASOPHILS NFR BLD AUTO: 0.6 % — SIGNIFICANT CHANGE UP (ref 0–2)
EOSINOPHIL # BLD AUTO: 0.35 K/UL — SIGNIFICANT CHANGE UP (ref 0–0.5)
EOSINOPHIL NFR BLD AUTO: 4.5 % — SIGNIFICANT CHANGE UP (ref 0–6)
HCT VFR BLD CALC: 26.9 % — LOW (ref 34.5–45)
HGB BLD-MCNC: 8.2 G/DL — LOW (ref 11.5–15.5)
IANC: 4.52 K/UL — SIGNIFICANT CHANGE UP (ref 1.8–7.4)
IMM GRANULOCYTES NFR BLD AUTO: 0.8 % — SIGNIFICANT CHANGE UP (ref 0–0.9)
LYMPHOCYTES # BLD AUTO: 1.97 K/UL — SIGNIFICANT CHANGE UP (ref 1–3.3)
LYMPHOCYTES # BLD AUTO: 25.4 % — SIGNIFICANT CHANGE UP (ref 13–44)
MCHC RBC-ENTMCNC: 21.2 PG — LOW (ref 27–34)
MCHC RBC-ENTMCNC: 30.5 GM/DL — LOW (ref 32–36)
MCV RBC AUTO: 69.7 FL — LOW (ref 80–100)
MONOCYTES # BLD AUTO: 0.8 K/UL — SIGNIFICANT CHANGE UP (ref 0–0.9)
MONOCYTES NFR BLD AUTO: 10.3 % — SIGNIFICANT CHANGE UP (ref 2–14)
NEUTROPHILS # BLD AUTO: 4.52 K/UL — SIGNIFICANT CHANGE UP (ref 1.8–7.4)
NEUTROPHILS NFR BLD AUTO: 58.4 % — SIGNIFICANT CHANGE UP (ref 43–77)
NRBC # BLD: 0 /100 WBCS — SIGNIFICANT CHANGE UP (ref 0–0)
NRBC # FLD: 0.03 K/UL — HIGH (ref 0–0)
PLATELET # BLD AUTO: 405 K/UL — HIGH (ref 150–400)
RBC # BLD: 3.86 M/UL — SIGNIFICANT CHANGE UP (ref 3.8–5.2)
RBC # FLD: 24.6 % — HIGH (ref 10.3–14.5)
WBC # BLD: 7.75 K/UL — SIGNIFICANT CHANGE UP (ref 3.8–10.5)
WBC # FLD AUTO: 7.75 K/UL — SIGNIFICANT CHANGE UP (ref 3.8–10.5)

## 2022-09-19 PROCEDURE — 99217: CPT

## 2022-09-19 NOTE — ED CDU PROVIDER SUBSEQUENT DAY NOTE - MEDICAL DECISION MAKING DETAILS
42F PMH fibroids s/p D&C sent in by ob/gyn. Had blood work done for heavy menstrual cycle (3 weeks of bleeding, stopped for 2 weeks, bled again for 3 weeks, now stopped bleeding 5 days ago). Had blood transfusion 2-3 yrs ago for heaving menstruation. Has exertional dyspnea and dizziness. No cp, abd pain, current vag.   in ER, HGB was 5.8, sent to cdu for prbc x 2

## 2022-09-19 NOTE — ED CDU PROVIDER DISPOSITION NOTE - CLINICAL COURSE
42yoF PMH fibroids, D+C, anemia requiring blood transfusions in past, presenting to ED for anemia, sent in by her OBGYN DR. Callie Monahan. Pt states she had been feeling increased shortness of breath/fatigue after having heavy vaginal bleeding for a total of 6 weeks with only 5 days without bleeding. hx of blood transfusions in past - last being 2019. hgb 5.8, seen by OBGYN, recommending 2units PRBC's, no indication for pharmacologic medication to stop bleeding, which pt received, symptoms improved this AM, hgb now 8.2. denies current vaginal bleeding, fevers, dysuria, polyuria, hematuria

## 2022-09-19 NOTE — ED CDU PROVIDER SUBSEQUENT DAY NOTE - PROGRESS NOTE DETAILS
cdu su evans: pt rested comfortably in bed all  night, had no complaints, received 2 units PRBC, will repeat AM labs 42yoF PMH fibroids, D+C, anemia requiring blood transfusions in past, presenting to ED for anemia, sent in by her OBGYN DR. Callie Monahan. Pt states she had been feeling increased shortness of breath/fatigue after having heavy vaginal bleeding for a total of 6 weeks with only 5 days without bleeding. hx of blood transfusions in past - last being 2019. hgb 5.8, seen by OBGYN, recommending 2units PRBC's, which pt received, symptoms improved this AM, hgb now 8.2. denies current vaginal bleeding, fevers, dysuria, polyuria, hematuria.

## 2022-09-19 NOTE — ED CDU PROVIDER DISPOSITION NOTE - NSFOLLOWUPINSTRUCTIONS_ED_ALL_ED_FT
See your primary care doctor within 24-48 hours for a post-hospital visit.   See your gyn this week for further evaluation, bring copies of all reports with you.   PELVIC REST UNTIL YOU ARE CLEARED BY YOUR GYN- no intercourse/douching/foreign objects in the vagina. Return to the ER for worsening bleeding, abdominal pain, fevers, dizziness or any other concerns.

## 2022-09-19 NOTE — ED CDU PROVIDER DISPOSITION NOTE - CARE PROVIDER_API CALL
Callie Monahan  OBSTETRICS AND GYNECOLOGY  [address], [address]  [place] [address]  Phone: [phone]  Fax: [phone]  Follow Up Time: 1-3 Days

## 2022-09-19 NOTE — ED CDU PROVIDER DISPOSITION NOTE - PATIENT PORTAL LINK FT
You can access the FollowMyHealth Patient Portal offered by [facility] by registering at the following website: [email or web address]. By joining [employer]’s FollowMyHealth portal, you will also be able to view your health information using other applications (apps) compatible with our system.

## 2022-09-23 ENCOUNTER — OUTPATIENT (OUTPATIENT)
Dept: OUTPATIENT SERVICES | Facility: HOSPITAL | Age: 42
LOS: 1 days | End: 2022-09-23

## 2022-09-23 VITALS
HEART RATE: 64 BPM | HEIGHT: 59 IN | RESPIRATION RATE: 16 BRPM | OXYGEN SATURATION: 100 % | DIASTOLIC BLOOD PRESSURE: 83 MMHG | SYSTOLIC BLOOD PRESSURE: 137 MMHG | WEIGHT: 132.06 LBS | TEMPERATURE: 98 F

## 2022-09-23 DIAGNOSIS — Z98.890 OTHER SPECIFIED POSTPROCEDURAL STATES: Chronic | ICD-10-CM

## 2022-09-23 DIAGNOSIS — N93.9 ABNORMAL UTERINE AND VAGINAL BLEEDING, UNSPECIFIED: ICD-10-CM

## 2022-09-23 DIAGNOSIS — Z87.42 PERSONAL HISTORY OF OTHER DISEASES OF THE FEMALE GENITAL TRACT: ICD-10-CM

## 2022-09-23 LAB
ANION GAP SERPL CALC-SCNC: 10 MMOL/L — SIGNIFICANT CHANGE UP (ref 7–14)
BLD GP AB SCN SERPL QL: NEGATIVE — SIGNIFICANT CHANGE UP
BUN SERPL-MCNC: 10 MG/DL — SIGNIFICANT CHANGE UP (ref 7–23)
CALCIUM SERPL-MCNC: 9.1 MG/DL — SIGNIFICANT CHANGE UP (ref 8.4–10.5)
CHLORIDE SERPL-SCNC: 103 MMOL/L — SIGNIFICANT CHANGE UP (ref 98–107)
CO2 SERPL-SCNC: 25 MMOL/L — SIGNIFICANT CHANGE UP (ref 22–31)
CREAT SERPL-MCNC: 0.68 MG/DL — SIGNIFICANT CHANGE UP (ref 0.5–1.3)
EGFR: 113 ML/MIN/1.73M2 — SIGNIFICANT CHANGE UP
GLUCOSE SERPL-MCNC: 81 MG/DL — SIGNIFICANT CHANGE UP (ref 70–99)
HCG UR QL: NEGATIVE — SIGNIFICANT CHANGE UP
HCT VFR BLD CALC: 29.5 % — LOW (ref 34.5–45)
HGB BLD-MCNC: 8.6 G/DL — LOW (ref 11.5–15.5)
MCHC RBC-ENTMCNC: 20.9 PG — LOW (ref 27–34)
MCHC RBC-ENTMCNC: 29.2 GM/DL — LOW (ref 32–36)
MCV RBC AUTO: 71.8 FL — LOW (ref 80–100)
NRBC # BLD: 0 /100 WBCS — SIGNIFICANT CHANGE UP (ref 0–0)
NRBC # FLD: 0 K/UL — SIGNIFICANT CHANGE UP (ref 0–0)
PLATELET # BLD AUTO: 425 K/UL — HIGH (ref 150–400)
POTASSIUM SERPL-MCNC: 4.3 MMOL/L — SIGNIFICANT CHANGE UP (ref 3.5–5.3)
POTASSIUM SERPL-SCNC: 4.3 MMOL/L — SIGNIFICANT CHANGE UP (ref 3.5–5.3)
RBC # BLD: 4.11 M/UL — SIGNIFICANT CHANGE UP (ref 3.8–5.2)
RBC # FLD: 26.9 % — HIGH (ref 10.3–14.5)
RH IG SCN BLD-IMP: POSITIVE — SIGNIFICANT CHANGE UP
SODIUM SERPL-SCNC: 138 MMOL/L — SIGNIFICANT CHANGE UP (ref 135–145)
WBC # BLD: 8.61 K/UL — SIGNIFICANT CHANGE UP (ref 3.8–10.5)
WBC # FLD AUTO: 8.61 K/UL — SIGNIFICANT CHANGE UP (ref 3.8–10.5)

## 2022-09-23 RX ORDER — NORETHINDRONE AND ETHINYL ESTRADIOL 0.4-0.035
1 KIT ORAL
Qty: 0 | Refills: 0 | DISCHARGE

## 2022-09-23 RX ORDER — SODIUM CHLORIDE 9 MG/ML
1000 INJECTION, SOLUTION INTRAVENOUS
Refills: 0 | Status: DISCONTINUED | OUTPATIENT
Start: 2022-10-05 | End: 2022-10-19

## 2022-09-23 NOTE — H&P PST ADULT - HISTORY OF PRESENT ILLNESS
42 yrs old female with h/o irregular bleeding and menorrhagia  for 2 months. Pt presents for preop eval to have hysteroscopy with Symphion on 4/10/19 As per pt menorrhagia started again this year and its heavy. Pt presents for preop eval to have D&C hysteroscopy with symphion on 10/5/22.

## 2022-09-23 NOTE — H&P PST ADULT - NSICDXPASTSURGICALHX_GEN_ALL_CORE_FT
PAST SURGICAL HISTORY:   delivery delivered in , , 2017 PAST SURGICAL HISTORY:   delivery delivered in , ,     H/O breast biopsy  right breast    History of D&C 2019

## 2022-09-23 NOTE — H&P PST ADULT - PROBLEM SELECTOR PLAN 1
42 yrs old female with menorrhagia presents for preop eval to have D&C  hysteroscopy with symphion on 10/5/22.  labs pending.  Preop instructions provided to pt.  Famotidine provided to pt with instructions.  Pt advised to obtain COVID pcr 3 days prior to DOS.

## 2022-09-23 NOTE — H&P PST ADULT - NSICDXPASTMEDICALHX_GEN_ALL_CORE_FT
PAST MEDICAL HISTORY:  Anemia received 2 units of PRBC in Feb 2019    Gestational diabetes mellitus (GDM), antepartum, gestational diabetes method of control unspecified     Menorrhagia PAST MEDICAL HISTORY:  Anemia received 2 units of PRBC on 9/18/22 and in Feb 2019    Gestational diabetes mellitus (GDM), antepartum, gestational diabetes method of control unspecified     Menorrhagia

## 2022-09-23 NOTE — H&P PST ADULT - ASSESSMENT
39 yrs old female with menorrhagia presents for preop eval to have hysteroscopy with saray on 4/10/19. 42 yrs old female with menorrhagia presents for preop eval to have D&C  hysteroscopy with symphion on 10/5/22.

## 2022-09-23 NOTE — H&P PST ADULT - NSANTHOSAYNRD_GEN_A_CORE
never tested/No. SILVIA screening performed.  STOP BANG Legend: 0-2 = LOW Risk; 3-4 = INTERMEDIATE Risk; 5-8 = HIGH Risk

## 2022-10-03 ENCOUNTER — OUTPATIENT (OUTPATIENT)
Dept: OUTPATIENT SERVICES | Facility: HOSPITAL | Age: 42
LOS: 1 days | End: 2022-10-03

## 2022-10-03 DIAGNOSIS — Z98.890 OTHER SPECIFIED POSTPROCEDURAL STATES: Chronic | ICD-10-CM

## 2022-10-03 DIAGNOSIS — N93.9 ABNORMAL UTERINE AND VAGINAL BLEEDING, UNSPECIFIED: ICD-10-CM

## 2022-10-03 PROBLEM — D64.9 ANEMIA, UNSPECIFIED: Chronic | Status: ACTIVE | Noted: 2019-03-29

## 2022-10-03 LAB
BLD GP AB SCN SERPL QL: NEGATIVE — SIGNIFICANT CHANGE UP
RH IG SCN BLD-IMP: POSITIVE — SIGNIFICANT CHANGE UP
SARS-COV-2 RNA SPEC QL NAA+PROBE: SIGNIFICANT CHANGE UP

## 2022-10-04 ENCOUNTER — TRANSCRIPTION ENCOUNTER (OUTPATIENT)
Age: 42
End: 2022-10-04

## 2022-10-04 NOTE — ASU PATIENT PROFILE, ADULT - FALL HARM RISK - UNIVERSAL INTERVENTIONS
Bed in lowest position, wheels locked, appropriate side rails in place/Call bell, personal items and telephone in reach/Non-slip footwear when patient is out of bed/[place] to call system/Physically safe environment - no spills, clutter or unnecessary equipment/Purposeful Proactive Rounding/Room/bathroom lighting operational, light cord in reach

## 2022-10-04 NOTE — ASU PATIENT PROFILE, ADULT - NSICDXPASTSURGICALHX_GEN_ALL_CORE_FT
PAST SURGICAL HISTORY:   delivery delivered in , ,     H/O breast biopsy  right breast    History of D&C 2019

## 2022-10-04 NOTE — ASU PATIENT PROFILE, ADULT - NS TRANSFER DENTURES
Check cost of eliquis or xarelto to see if you can afford it because   inr high all the time on coumadin  Check LAb: LFTS to make sure liver isn't  Causing high INR  Update echo for Asc Ao dilated and LVEF function  Decrease alcohol intake   NA

## 2022-10-04 NOTE — ASU PATIENT PROFILE, ADULT - NSICDXPASTMEDICALHX_GEN_ALL_CORE_FT
PAST MEDICAL HISTORY:  Anemia received 2 units of PRBC on 9/18/22 and in Feb 2019    Gestational diabetes mellitus (GDM), antepartum, gestational diabetes method of control unspecified     Menorrhagia

## 2022-10-05 ENCOUNTER — TRANSCRIPTION ENCOUNTER (OUTPATIENT)
Age: 42
End: 2022-10-05

## 2022-10-05 ENCOUNTER — RESULT REVIEW (OUTPATIENT)
Age: 42
End: 2022-10-05

## 2022-10-05 ENCOUNTER — OUTPATIENT (OUTPATIENT)
Dept: OUTPATIENT SERVICES | Facility: HOSPITAL | Age: 42
LOS: 1 days | Discharge: HOME | End: 2022-10-05

## 2022-10-05 VITALS
RESPIRATION RATE: 12 BRPM | SYSTOLIC BLOOD PRESSURE: 103 MMHG | HEIGHT: 59 IN | HEART RATE: 71 BPM | OXYGEN SATURATION: 99 % | WEIGHT: 130.95 LBS | TEMPERATURE: 98 F | DIASTOLIC BLOOD PRESSURE: 62 MMHG

## 2022-10-05 VITALS
DIASTOLIC BLOOD PRESSURE: 60 MMHG | SYSTOLIC BLOOD PRESSURE: 111 MMHG | HEART RATE: 61 BPM | OXYGEN SATURATION: 98 % | RESPIRATION RATE: 17 BRPM

## 2022-10-05 DIAGNOSIS — Z98.890 OTHER SPECIFIED POSTPROCEDURAL STATES: Chronic | ICD-10-CM

## 2022-10-05 DIAGNOSIS — N93.9 ABNORMAL UTERINE AND VAGINAL BLEEDING, UNSPECIFIED: ICD-10-CM

## 2022-10-05 LAB — HCG UR QL: NEGATIVE — SIGNIFICANT CHANGE UP

## 2022-10-05 PROCEDURE — 88305 TISSUE EXAM BY PATHOLOGIST: CPT | Mod: 26

## 2022-10-05 RX ORDER — BENZOYL PEROXIDE MICRONIZED 5.8 %
1 TOWELETTE (EA) TOPICAL
Qty: 0 | Refills: 0 | DISCHARGE

## 2022-10-05 RX ORDER — SODIUM CHLORIDE 9 MG/ML
1000 INJECTION, SOLUTION INTRAVENOUS
Refills: 0 | Status: DISCONTINUED | OUTPATIENT
Start: 2022-10-05 | End: 2022-10-19

## 2022-10-05 RX ORDER — LANOLIN ALCOHOL/MO/W.PET/CERES
1 CREAM (GRAM) TOPICAL
Qty: 0 | Refills: 0 | DISCHARGE

## 2022-10-05 NOTE — ASU DISCHARGE PLAN (ADULT/PEDIATRIC) - CALL YOUR DOCTOR IF YOU HAVE ANY OF THE FOLLOWING:
Bleeding that does not stop/Fever greater than (need to indicate Fahrenheit or Celsius)/Wound/Surgical Site with redness, or foul smelling discharge or pus/Nausea and vomiting that does not stop/Unable to urinate/Inability to tolerate liquids or foods/Increased irritability or sluggishness

## 2022-10-05 NOTE — ASU DISCHARGE PLAN (ADULT/PEDIATRIC) - CARE PROVIDER_API CALL
Callie Monahan  OBSTETRICS AND GYNECOLOGY  [address], [address]  [place] [address]  Phone: [phone]  Fax: [phone]  Follow Up Time: 2 weeks

## 2022-10-05 NOTE — ASU DISCHARGE PLAN (ADULT/PEDIATRIC) - PAIN MANAGEMENT
Take over the counter pain medication You received IV Tylenol for pain management at 7:45 AM. Please DO NOT take any Tylenol (Acetaminophen) containing products, such as Vicodin, Percocet, Excedrin, and cold medications for the next 6 hours (until 1:45 PM). DO NOT TAKE MORE THAN 4000 MG OF TYLENOL in a 24 hour period./Take over the counter pain medication

## 2022-10-05 NOTE — ASU DISCHARGE PLAN (ADULT/PEDIATRIC) - ACTIVITY LEVEL
Nothing per vagina/No tub baths/No douching/No tampons/No intercourse for 2 weeks/No excercise/No heavy lifting/No sports/gym/Nothing per vagina/No tub baths/No douching/No tampons/No intercourse

## 2022-10-05 NOTE — ASU DISCHARGE PLAN (ADULT/PEDIATRIC) - FOLLOW UP APPOINTMENTS
may also call Recovery Room (PACU) 24/7 @ [phone]/[facility]

## 2022-10-07 LAB — SURGICAL PATHOLOGY STUDY: SIGNIFICANT CHANGE UP

## 2023-03-12 NOTE — ASU PATIENT PROFILE, ADULT - PATIENT KNOW
Pt to ed with family from home. Pt states 5 days ago she developed right leg pain, swelling. Pt states she went to urgent care, they ordered vascular scan, pt was diagnosed with blood clot to the right leg. Pt started on antibiotics, given 2 days worth of eloquis, prescribed follow up dvt study next week. Pt states today she woke up in a cold sweat, with chest tightness, sob and left lower leg pain, swelling, redness with warmth. Pt states she took tylenol this am.   Pt did not take antibiotics today, has been off her baby asa due to eloquis. Pt is alert, oriented, speaking in full, complete sentences. Pt changed into gown and asked to remove pants for further evaluation of legs.       Iban Lewis RN  03/12/23 0158 yes
